# Patient Record
Sex: FEMALE | Race: WHITE | NOT HISPANIC OR LATINO | Employment: UNEMPLOYED | URBAN - METROPOLITAN AREA
[De-identification: names, ages, dates, MRNs, and addresses within clinical notes are randomized per-mention and may not be internally consistent; named-entity substitution may affect disease eponyms.]

---

## 2023-03-30 DIAGNOSIS — I10 BENIGN HYPERTENSION: Primary | ICD-10-CM

## 2023-03-30 PROBLEM — R31.1 BENIGN ESSENTIAL MICROSCOPIC HEMATURIA: Status: ACTIVE | Noted: 2023-03-30

## 2023-03-30 PROBLEM — E66.01 MORBID OBESITY (MULTI): Status: ACTIVE | Noted: 2023-03-30

## 2023-03-30 PROBLEM — D58.2 ELEVATED HEMOGLOBIN (CMS-HCC): Status: ACTIVE | Noted: 2023-03-30

## 2023-03-30 PROBLEM — E83.52 HYPERCALCEMIA: Status: ACTIVE | Noted: 2023-03-30

## 2023-03-30 PROBLEM — R73.9 HYPERGLYCEMIA: Status: ACTIVE | Noted: 2023-03-30

## 2023-03-30 PROBLEM — N12 PYELONEPHRITIS: Status: RESOLVED | Noted: 2023-03-30 | Resolved: 2023-03-30

## 2023-03-30 PROBLEM — R31.9 HEMATURIA: Status: ACTIVE | Noted: 2023-03-30

## 2023-03-30 PROBLEM — B35.4 TINEA CORPORIS: Status: ACTIVE | Noted: 2023-03-30

## 2023-03-30 PROBLEM — D75.1 POLYCYTHEMIA: Status: ACTIVE | Noted: 2023-03-30

## 2023-03-30 PROBLEM — M85.80 OSTEOPENIA: Status: ACTIVE | Noted: 2023-03-30

## 2023-03-30 PROBLEM — M81.0 OSTEOPOROSIS: Status: ACTIVE | Noted: 2023-03-30

## 2023-03-30 RX ORDER — NYSTATIN 100000 [USP'U]/G
POWDER TOPICAL
COMMUNITY
End: 2023-07-26

## 2023-03-30 RX ORDER — ATENOLOL AND CHLORTHALIDONE TABLET 50; 25 MG/1; MG/1
1 TABLET ORAL DAILY
Qty: 90 TABLET | Refills: 0 | Status: SHIPPED | OUTPATIENT
Start: 2023-03-30 | End: 2023-06-30 | Stop reason: SDUPTHER

## 2023-03-30 RX ORDER — CHOLECALCIFEROL (VITAMIN D3) 50 MCG
50 TABLET ORAL DAILY
COMMUNITY

## 2023-03-30 RX ORDER — AMOXICILLIN 500 MG
CAPSULE ORAL
COMMUNITY

## 2023-03-30 RX ORDER — ATENOLOL AND CHLORTHALIDONE TABLET 50; 25 MG/1; MG/1
1 TABLET ORAL DAILY
COMMUNITY
End: 2023-07-26

## 2023-03-30 RX ORDER — PSEUDOEPHEDRINE HCL 30 MG
TABLET ORAL
COMMUNITY

## 2023-03-30 RX ORDER — POTASSIUM CHLORIDE 1500 MG/1
1 TABLET, EXTENDED RELEASE ORAL DAILY
COMMUNITY
Start: 2019-08-16 | End: 2023-07-26 | Stop reason: SDUPTHER

## 2023-03-30 RX ORDER — NYSTATIN AND TRIAMCINOLONE ACETONIDE 100000; 1 [USP'U]/G; MG/G
CREAM TOPICAL
COMMUNITY
Start: 2022-08-19 | End: 2023-07-26

## 2023-06-27 ENCOUNTER — TELEPHONE (OUTPATIENT)
Dept: PRIMARY CARE | Facility: CLINIC | Age: 63
End: 2023-06-27
Payer: COMMERCIAL

## 2023-06-27 DIAGNOSIS — I10 BENIGN HYPERTENSION: ICD-10-CM

## 2023-06-29 NOTE — TELEPHONE ENCOUNTER
Rx Refill Request Telephone Encounter    Name:  Jamar Ibarra  :  [id number]  Medication Name:    Atenolol 50-25mg 1 tab every day - 90 day   Specific Pharmacy location:  [facility]  Date of last appointment:  22  Date of next appointment:  23  Best number to reach patient:  [phone]

## 2023-06-30 RX ORDER — ATENOLOL AND CHLORTHALIDONE TABLET 50; 25 MG/1; MG/1
TABLET ORAL
Qty: 90 TABLET | Refills: 0 | Status: SHIPPED | OUTPATIENT
Start: 2023-06-30 | End: 2023-07-26 | Stop reason: SDUPTHER

## 2023-07-26 ENCOUNTER — OFFICE VISIT (OUTPATIENT)
Dept: PRIMARY CARE | Facility: CLINIC | Age: 63
End: 2023-07-26
Payer: COMMERCIAL

## 2023-07-26 ENCOUNTER — LAB (OUTPATIENT)
Dept: LAB | Facility: LAB | Age: 63
End: 2023-07-26
Payer: COMMERCIAL

## 2023-07-26 VITALS
OXYGEN SATURATION: 94 % | BODY MASS INDEX: 38.4 KG/M2 | DIASTOLIC BLOOD PRESSURE: 63 MMHG | SYSTOLIC BLOOD PRESSURE: 123 MMHG | HEIGHT: 59 IN | WEIGHT: 190.5 LBS | HEART RATE: 53 BPM | TEMPERATURE: 98.1 F

## 2023-07-26 DIAGNOSIS — E66.01 MORBID OBESITY (MULTI): ICD-10-CM

## 2023-07-26 DIAGNOSIS — Z00.00 ROUTINE GENERAL MEDICAL EXAMINATION AT A HEALTH CARE FACILITY: ICD-10-CM

## 2023-07-26 DIAGNOSIS — R73.9 HYPERGLYCEMIA: ICD-10-CM

## 2023-07-26 DIAGNOSIS — I10 BENIGN HYPERTENSION: ICD-10-CM

## 2023-07-26 DIAGNOSIS — Z12.31 VISIT FOR SCREENING MAMMOGRAM: ICD-10-CM

## 2023-07-26 DIAGNOSIS — M85.80 OSTEOPENIA, UNSPECIFIED LOCATION: ICD-10-CM

## 2023-07-26 DIAGNOSIS — Z00.00 ROUTINE GENERAL MEDICAL EXAMINATION AT A HEALTH CARE FACILITY: Primary | ICD-10-CM

## 2023-07-26 DIAGNOSIS — Z78.0 MENOPAUSE: ICD-10-CM

## 2023-07-26 DIAGNOSIS — Z13.820 SCREENING FOR OSTEOPOROSIS: ICD-10-CM

## 2023-07-26 LAB
ALANINE AMINOTRANSFERASE (SGPT) (U/L) IN SER/PLAS: 36 U/L (ref 7–45)
ALBUMIN (G/DL) IN SER/PLAS: 4.2 G/DL (ref 3.4–5)
ALKALINE PHOSPHATASE (U/L) IN SER/PLAS: 43 U/L (ref 33–136)
ANION GAP IN SER/PLAS: 16 MMOL/L (ref 10–20)
ASPARTATE AMINOTRANSFERASE (SGOT) (U/L) IN SER/PLAS: 27 U/L (ref 9–39)
BILIRUBIN TOTAL (MG/DL) IN SER/PLAS: 0.7 MG/DL (ref 0–1.2)
CALCIDIOL (25 OH VITAMIN D3) (NG/ML) IN SER/PLAS: 62 NG/ML
CALCIUM (MG/DL) IN SER/PLAS: 10.4 MG/DL (ref 8.6–10.3)
CARBON DIOXIDE, TOTAL (MMOL/L) IN SER/PLAS: 26 MMOL/L (ref 21–32)
CHLORIDE (MMOL/L) IN SER/PLAS: 100 MMOL/L (ref 98–107)
CHOLESTEROL (MG/DL) IN SER/PLAS: 209 MG/DL (ref 0–199)
CHOLESTEROL IN HDL (MG/DL) IN SER/PLAS: 39.6 MG/DL
CHOLESTEROL/HDL RATIO: 5.3
CREATININE (MG/DL) IN SER/PLAS: 0.75 MG/DL (ref 0.5–1.05)
ERYTHROCYTE DISTRIBUTION WIDTH (RATIO) BY AUTOMATED COUNT: 13.1 % (ref 11.5–14.5)
ERYTHROCYTE MEAN CORPUSCULAR HEMOGLOBIN CONCENTRATION (G/DL) BY AUTOMATED: 34 G/DL (ref 32–36)
ERYTHROCYTE MEAN CORPUSCULAR VOLUME (FL) BY AUTOMATED COUNT: 84 FL (ref 80–100)
ERYTHROCYTES (10*6/UL) IN BLOOD BY AUTOMATED COUNT: 5.77 X10E12/L (ref 4–5.2)
ESTIMATED AVERAGE GLUCOSE FOR HBA1C: 128 MG/DL
GFR FEMALE: 89 ML/MIN/1.73M2
GLUCOSE (MG/DL) IN SER/PLAS: 113 MG/DL (ref 74–99)
HEMATOCRIT (%) IN BLOOD BY AUTOMATED COUNT: 48.6 % (ref 36–46)
HEMOGLOBIN (G/DL) IN BLOOD: 16.5 G/DL (ref 12–16)
HEMOGLOBIN A1C/HEMOGLOBIN TOTAL IN BLOOD: 6.1 %
LDL: 137 MG/DL (ref 0–99)
LEUKOCYTES (10*3/UL) IN BLOOD BY AUTOMATED COUNT: 7.1 X10E9/L (ref 4.4–11.3)
NRBC (PER 100 WBCS) BY AUTOMATED COUNT: 0 /100 WBC (ref 0–0)
PLATELETS (10*3/UL) IN BLOOD AUTOMATED COUNT: 271 X10E9/L (ref 150–450)
POTASSIUM (MMOL/L) IN SER/PLAS: 3.8 MMOL/L (ref 3.5–5.3)
PROTEIN TOTAL: 7 G/DL (ref 6.4–8.2)
SODIUM (MMOL/L) IN SER/PLAS: 138 MMOL/L (ref 136–145)
TRIGLYCERIDE (MG/DL) IN SER/PLAS: 162 MG/DL (ref 0–149)
UREA NITROGEN (MG/DL) IN SER/PLAS: 16 MG/DL (ref 6–23)
VLDL: 32 MG/DL (ref 0–40)

## 2023-07-26 PROCEDURE — 36415 COLL VENOUS BLD VENIPUNCTURE: CPT

## 2023-07-26 PROCEDURE — 80061 LIPID PANEL: CPT

## 2023-07-26 PROCEDURE — 3074F SYST BP LT 130 MM HG: CPT | Performed by: FAMILY MEDICINE

## 2023-07-26 PROCEDURE — 83036 HEMOGLOBIN GLYCOSYLATED A1C: CPT

## 2023-07-26 PROCEDURE — 1036F TOBACCO NON-USER: CPT | Performed by: FAMILY MEDICINE

## 2023-07-26 PROCEDURE — 99396 PREV VISIT EST AGE 40-64: CPT | Performed by: FAMILY MEDICINE

## 2023-07-26 PROCEDURE — 85027 COMPLETE CBC AUTOMATED: CPT

## 2023-07-26 PROCEDURE — 82306 VITAMIN D 25 HYDROXY: CPT

## 2023-07-26 PROCEDURE — 3078F DIAST BP <80 MM HG: CPT | Performed by: FAMILY MEDICINE

## 2023-07-26 PROCEDURE — 80053 COMPREHEN METABOLIC PANEL: CPT

## 2023-07-26 PROCEDURE — 99214 OFFICE O/P EST MOD 30 MIN: CPT | Performed by: FAMILY MEDICINE

## 2023-07-26 RX ORDER — POTASSIUM CHLORIDE 1500 MG/1
20 TABLET, EXTENDED RELEASE ORAL DAILY
Qty: 90 TABLET | Refills: 1 | Status: SHIPPED | OUTPATIENT
Start: 2023-07-26 | End: 2024-01-29

## 2023-07-26 RX ORDER — ATENOLOL AND CHLORTHALIDONE TABLET 50; 25 MG/1; MG/1
1 TABLET ORAL DAILY
Qty: 90 TABLET | Refills: 0 | Status: SHIPPED | OUTPATIENT
Start: 2023-07-26 | End: 2023-07-26 | Stop reason: SDUPTHER

## 2023-07-26 RX ORDER — POTASSIUM CHLORIDE 1500 MG/1
20 TABLET, EXTENDED RELEASE ORAL DAILY
Qty: 90 TABLET | Refills: 0 | Status: SHIPPED | OUTPATIENT
Start: 2023-07-26 | End: 2023-07-26 | Stop reason: SDUPTHER

## 2023-07-26 RX ORDER — ATENOLOL AND CHLORTHALIDONE TABLET 50; 25 MG/1; MG/1
1 TABLET ORAL DAILY
Qty: 90 TABLET | Refills: 1 | Status: SHIPPED | OUTPATIENT
Start: 2023-07-26 | End: 2024-01-29

## 2023-07-26 NOTE — PATIENT INSTRUCTIONS
Get your blood work as ordered.  You should hear from our office with results whether they are normal are not within a few days.  Please call the office if you do not hear from us.     Hypertension  : Patient is taking blood pressure medications as directed.  Blood pressures have been averaging:  Pt denies Chest Pain or Shortness of Breath     Your bone density shows osteopenia which is a thinning of the bones.  I recommend that you take vitamin D 1000 units daily, get an adequate intake of calcium daily.  Postmenopausal women should get 1500 mg of calcium per day.  This is about 4-5 servings per day of calcium rich foods such as dairy products or almond milk.  If you are not getting 1500 mg per day and would recommend adding on a calcium supplement.  Also doing weightbearing exercise is helpful.  We should repeat a DEXA scan 2 years.     You should be getting cardiovascular exercise 3-5 times per week for 30-45 minutes.  This includes exercises such as running, brisk walking, biking or swimming.       It is important to actively try to reduce your weight to become healthier.  There are several things you can do to try and lose weight.  You should be getting 30-45 minutes of cardiovascular exercise 3-5 times per week, activities such as running and jogging treadmill swimming and brisk walking are helpful.  You will also need to watch your portion control, decrease calorie intake overall.  Following a low carbohydrate diet is the most successful way to lose weight and take it off long-term.  In order to achieve weight loss it is important that you track exactly what your calorie intake is during the day.  I usually recommend doing some sort of formal program or Coleen. there are lot of good Apps out there to help you follow your calorie intake such as weight watchers, Noom, Fitbit.  It is recommended that you reduce the intake of sweets, sugar, reduce carbohydrate intake.  Healthy diets with more whole grains,  vegetables, fruits, nuts and seeds with plant oils are healthier diets than animal-based fats.  Reduce your intake of white bread, grains, potatoes, processed foods.     Suggest shingles vaccine

## 2023-07-26 NOTE — PROGRESS NOTES
Subjective   Patient ID: Jamar Ibarra is a 63 y.o. female who presents for Annual Exam. Fasting for labs. No concerns. Requesting a mammogram order.      Hypertension  : Patient is taking blood pressure medications as directed.  Blood pressures have been averaging:  occasionally 123/59, 131/64  Pt denies Chest Pain or Shortness of Breath     Some exercise       Hyperglycemia    Elevated cholesterol     Osteopenia        ROS :  ( No or Yes )  Any eye problems:    N  Frequent nasal congestion or sneezing:  N  Difficulty hearing:  N  Ear problems:   N  Asthma or wheezing:   N  Frequent cough:   N  Shortness of breath:N  Hemoptysis: N  Hx of TB: N  High blood pressure: y  Heart disease: N  Heart murmur:N  Chest pain or pressure with exertion:N  Leg pains with walking up hill: N  Fast heartbeat or palpitations:N  Varicose veins: N  Difficulty swallowing foods or liquids: N  Abdominal pains: N  Frequent indigestion or heartburn: N  Constipation: N  Diarrhea or loose stools: N  Weight changes recently: N  Change in bowel movements: N  An ulcer: N  Black stools: N  Jaundice, hepatitis or liver problems: N  Gallstones or gallbladder problems: N  Stomach or intestinal problems: N  Vomited blood : N  Blood in bowel movements: N  Sickle cell trait  or Anemia: N  Been refused as a blood donor: N  Problems with her kidney, bladder, or prostate: N  Loss of control of your urine: N  Pain or burning with urination: N  Blood in her urine: N  Trouble starting flow of urine: N  Frequent urination at night: N  History of venereal disease: N  Any skin problems: N  Diabetes: N  Thyroid disease: N  Frequent back pain: N  Pain or swelling around joints: N  Broken any bones: N  Frequent headaches: N  Dizziness: N  Have you ever had Seizures or convulsions: N  Have you ever temporarily lost control of your hand or foot : N   Had a stroke or been paralyzed : N  Temporarily lost your ability to speak: N  Fainted or lost consciousness:  N  Hallucinations: N  Nervousness: N  Do you take medications for your nerves: N  Trouble falling asleep or staying asleep: N  Do you feel tired even after a good night sleep: N  Do you feel down in the dumps or depressed: N  Frequent crying: N  Using alcohol excessively: N  Any street drug use : N  Do have any other medical problems that are concerns :     Review of Systems    Objective   There were no vitals taken for this visit.    Physical Exam  Constitutional:       General: She is not in acute distress.     Appearance: Normal appearance.   HENT:      Head: Normocephalic and atraumatic.      Right Ear: Tympanic membrane and ear canal normal.      Left Ear: Tympanic membrane and ear canal normal.      Mouth/Throat:      Mouth: Mucous membranes are moist.   Eyes:      Conjunctiva/sclera: Conjunctivae normal.      Pupils: Pupils are equal, round, and reactive to light.   Neck:      Vascular: No carotid bruit.   Cardiovascular:      Rate and Rhythm: Normal rate and regular rhythm.      Heart sounds: No murmur heard.  Pulmonary:      Effort: Pulmonary effort is normal.      Breath sounds: Normal breath sounds. No wheezing or rhonchi.   Abdominal:      General: Bowel sounds are normal.      Palpations: Abdomen is soft.   Musculoskeletal:         General: No swelling.      Cervical back: No rigidity.   Lymphadenopathy:      Cervical: No cervical adenopathy.   Skin:     General: Skin is warm and dry.      Findings: No rash.   Neurological:      General: No focal deficit present.      Mental Status: She is alert.   Psychiatric:         Mood and Affect: Mood normal.         Assessment/Plan   Problem List Items Addressed This Visit       Benign hypertension    Hyperglycemia    Morbid obesity (CMS/HCC)    Osteopenia     Other Visit Diagnoses       Routine general medical examination at a health care facility    -  Primary

## 2023-07-27 ENCOUNTER — TELEPHONE (OUTPATIENT)
Dept: PRIMARY CARE | Facility: CLINIC | Age: 63
End: 2023-07-27
Payer: COMMERCIAL

## 2023-07-27 NOTE — TELEPHONE ENCOUNTER
----- Message from Socorro Perdomo MD sent at 7/27/2023 11:45 AM EDT -----  Sugar is elevated in a prediabetic range and cholesterol is elevated, recommend low-carb low sugar diet, weight loss, increase exercise no anemia, kidney and liver function normal, vitamin D is okay  Follow-up 6 months for recheck

## 2023-07-27 NOTE — RESULT ENCOUNTER NOTE
Sugar is elevated in a prediabetic range and cholesterol is elevated, recommend low-carb low sugar diet, weight loss, increase exercise no anemia, kidney and liver function normal, vitamin D is okay  Follow-up 6 months for recheck

## 2023-10-16 ENCOUNTER — HOSPITAL ENCOUNTER (OUTPATIENT)
Dept: RADIOLOGY | Facility: HOSPITAL | Age: 63
Discharge: HOME | End: 2023-10-16
Payer: COMMERCIAL

## 2023-10-16 DIAGNOSIS — Z12.31 ENCOUNTER FOR SCREENING MAMMOGRAM FOR MALIGNANT NEOPLASM OF BREAST: ICD-10-CM

## 2023-10-16 DIAGNOSIS — Z13.820 ENCOUNTER FOR SCREENING FOR OSTEOPOROSIS: ICD-10-CM

## 2023-10-16 DIAGNOSIS — Z78.0 ASYMPTOMATIC MENOPAUSAL STATE: ICD-10-CM

## 2023-10-16 PROCEDURE — 77067 SCR MAMMO BI INCL CAD: CPT

## 2023-10-16 PROCEDURE — 77067 SCR MAMMO BI INCL CAD: CPT | Mod: BILATERAL PROCEDURE | Performed by: RADIOLOGY

## 2023-10-16 PROCEDURE — 77080 DXA BONE DENSITY AXIAL: CPT | Performed by: RADIOLOGY

## 2023-10-16 PROCEDURE — 77063 BREAST TOMOSYNTHESIS BI: CPT | Mod: BILATERAL PROCEDURE | Performed by: RADIOLOGY

## 2023-10-16 PROCEDURE — 77080 DXA BONE DENSITY AXIAL: CPT

## 2023-10-17 ENCOUNTER — TELEPHONE (OUTPATIENT)
Dept: PRIMARY CARE | Facility: CLINIC | Age: 63
End: 2023-10-17
Payer: COMMERCIAL

## 2023-10-17 DIAGNOSIS — R92.8 ABNORMAL MAMMOGRAM: Primary | ICD-10-CM

## 2023-10-17 NOTE — TELEPHONE ENCOUNTER
Result Communication    Resulted Orders   XR DEXA bone density    Narrative    Interpreted By:  Ramone Cantu,   STUDY:  DEXA BONE [id number]/16/2023 9:16 am      INDICATION:  Signs/Symptoms: screen.  The patient is a 64 y/o  year old F.      COMPARISON:  10/12/2021      ACCESSION NUMBER(S):  [id number]      ORDERING CLINICIAN:  YESENIA HOOVER      TECHNIQUE:  DEXA BONE DENSITY      FINDINGS:  SPINE L1-L4  Bone Mineral Density: 0.836  T-Score -1.9  Z-Score -0.2  Bone Mineral Density change vs baseline:  10.8  Bone Mineral Density change vs previous: 2.5      LEFT FEMUR -TOTAL  Bone Mineral Density: 0.948  T-Score 0.0   Z-Score  1.3  Bone Mineral Density change vs baseline: 24.1  Bone Mineral Density change vs previous: 25.4      LEFT FEMUR -NECK  Bone Mineral Density: 0.639  T-Score -1.9  Z-Score -0.4      LEFT FOREARM  Total  Bone Mineral Density: Not reported  T-Score Not reported  Z-Score Not reported  UD  Bone Mineral Density: Not reported  T-Score Not reported Z-Score Not reported  MID  Bone Mineral Density: Not reported  T-Score Not reported Z-Score Not reported  1/3  Bone Mineral Density: Not reported  T-Score Not reported   Z-Score Not reported  Bone Mineral Density change vs baseline:  Not reported  Bone Mineral Density change vs previous:  Not reported      RIGHT FEMUR -TOTAL  Bone Mineral Density: Not reported  T-Score Not reported   Z-Score Not reported  Bone Mineral Density change vs baseline:  Not reported  Bone Mineral Density change vs previous: Not reported      RIGHT FEMUR -NECK  Bone Mineral Density: Not reported  T-Score Not reported   Z-Score  Not reported      RIGHT FOREARM  Bone Mineral Density: Not reported  T-Score Not reported   Z-Score Not reported  UD  Bone Mineral Density: Not reported  T-Score Not reported  Z-Score Not reported  MID  Bone Mineral Density: Not reported  T-Score Not reported  Z-Score  Not reported  1/3  Bone Mineral Density: Not reported  T-Score Not reported  Z-Score  Not reported  Bone Mineral Density change vs baseline:  Not reported  Bone Mineral Density change vs previous:  Not reported          World Health Organization (WHO) criteria for post-menopausal,   Women:  Normal:         T-score at or above -1 SD  Osteopenia:   T-score between -1 and -2.5 SD  Osteoporosis: T-score at or below -2.5 SD          10-year Fracture Risk:  Major Osteoporotic Fracture  8.9  Hip Fracture                        1.1      Note:  If no FRAX score is reported, it is because:  Some T-score for Spine Total or Hip Total or Femoral Neck at or below  -2.5      This exam was performed at [facility] on a [employer] Dexa Unit.        Impression    DEXA:  According to World Health Organization criteria,  classification is low bone mass (osteopenia)      Followup recommended in two years or sooner as clinically warranted.      All images and detailed analysis are available on the  Radiology  PACS.      MACRO:  None      Signed by: Ramone Cantu 10/16/2023 4:47 PM  Dictation workstation:   [id number]       4:00 PM  Socorro Perdomo MD  [address] [place] [address] Clinical Support Staff  Bone density does show some osteopenia but it is improved from previous continue calcium, vitamin D, regular weightbearing exercise    Results were successfully communicated with the patient and they acknowledged their understanding.

## 2023-10-17 NOTE — TELEPHONE ENCOUNTER
----- Message from Socorro Perdomo MD sent at 10/17/2023 11:09 AM EDT -----  Bone density does show some osteopenia but it is improved from previous continue calcium, vitamin D, regular weightbearing exercise

## 2023-10-18 ENCOUNTER — TELEPHONE (OUTPATIENT)
Dept: PRIMARY CARE | Facility: CLINIC | Age: 63
End: 2023-10-18
Payer: COMMERCIAL

## 2023-10-18 NOTE — TELEPHONE ENCOUNTER
Result Communication    Resulted Orders   BI mammo bilateral screening tomosynthesis    Narrative    Interpreted By:  Ramone Cantu,   STUDY:  BI MAMMO BILATERAL SCREENING TOMOSYNTHESIS;  10/16/2023 9:19 am      ACCESSION NUMBER(S):  [id number]      ORDERING CLINICIAN:  YESENIA HOOVER      INDICATION:  Screening.      COMPARISON:  10/14/2022      FINDINGS:  2D and tomosynthesis images were reviewed at 1 mm slice thickness.      Density:  There are areas of scattered fibroglandular tissue.      Slight interval increase in size of sharply marginated mass within  the upper-outer quadrant of the left breast, axillary tail when  compared to previous exam. This is located at 2 o'clock position  approximately 16.6 cm from the nipple. No additional suspicious  masses or microcalcifications bilaterally.        Impression    1. No mammographic evidence for malignancy in the right breast  2. Slight interval increase in size of left breast mass as above  which needs further evaluation with targeted ultrasound.          BI-RADS CATEGORY:      BI-RADS Category:  0 Incomplete; Need Additional Imaging Evaluation  and/or Prior Mammograms for Comparison. Recommendation:  Additional  Imaging Diagnostic Mammogram. Recommended Date:  Immediate.  Laterality:  Left.                              For any future breast imaging appointments, please call [phone] ([phone]).          MACRO:  None          Signed by: Ramone Cantu 10/17/2023 3:49 PM  Dictation workstation:   [id number]       12:12 PM  MD AKIRA [facility]irre [facility] [place] [address] Clinical Support Staff  The left breast requires further evaluation with an ultrasound    LVM. CA

## 2023-10-18 NOTE — TELEPHONE ENCOUNTER
----- Message from Socorro Perdomo MD sent at 10/17/2023  4:22 PM EDT -----  The left breast requires further evaluation with an ultrasound

## 2023-10-26 ENCOUNTER — ANCILLARY PROCEDURE (OUTPATIENT)
Dept: RADIOLOGY | Facility: HOSPITAL | Age: 63
End: 2023-10-26
Payer: COMMERCIAL

## 2023-10-26 DIAGNOSIS — R92.8 ABNORMAL MAMMOGRAM: ICD-10-CM

## 2023-10-26 PROCEDURE — 76642 ULTRASOUND BREAST LIMITED: CPT | Mod: LEFT SIDE | Performed by: RADIOLOGY

## 2023-10-26 PROCEDURE — 76982 USE 1ST TARGET LESION: CPT

## 2023-10-26 PROCEDURE — 76642 ULTRASOUND BREAST LIMITED: CPT | Mod: LT

## 2023-10-27 ENCOUNTER — TELEPHONE (OUTPATIENT)
Dept: PRIMARY CARE | Facility: CLINIC | Age: 63
End: 2023-10-27
Payer: COMMERCIAL

## 2023-10-27 NOTE — TELEPHONE ENCOUNTER
----- Message from Socorro Perdomo MD sent at 10/26/2023  5:17 PM EDT -----  The breast looks benign, area of concern is consistent with a benign lymph node, repeat mammogram 1 year

## 2024-01-29 DIAGNOSIS — R73.9 HYPERGLYCEMIA: ICD-10-CM

## 2024-01-29 DIAGNOSIS — E66.01 MORBID OBESITY (MULTI): ICD-10-CM

## 2024-01-29 DIAGNOSIS — M85.80 OSTEOPENIA, UNSPECIFIED LOCATION: ICD-10-CM

## 2024-01-29 DIAGNOSIS — Z00.00 ROUTINE GENERAL MEDICAL EXAMINATION AT A HEALTH CARE FACILITY: ICD-10-CM

## 2024-01-29 DIAGNOSIS — I10 BENIGN HYPERTENSION: ICD-10-CM

## 2024-01-29 RX ORDER — POTASSIUM CHLORIDE 1500 MG/1
20 TABLET, EXTENDED RELEASE ORAL DAILY
Qty: 90 TABLET | Refills: 0 | Status: SHIPPED | OUTPATIENT
Start: 2024-01-29 | End: 2024-04-26

## 2024-01-29 RX ORDER — ASPIRIN 81 MG/1
TABLET ORAL
COMMUNITY
End: 2024-05-20

## 2024-01-29 RX ORDER — ATENOLOL AND CHLORTHALIDONE TABLET 50; 25 MG/1; MG/1
1 TABLET ORAL DAILY
Qty: 90 TABLET | Refills: 0 | Status: SHIPPED | OUTPATIENT
Start: 2024-01-29 | End: 2024-04-26

## 2024-04-25 DIAGNOSIS — E66.01 MORBID OBESITY (MULTI): ICD-10-CM

## 2024-04-25 DIAGNOSIS — I10 BENIGN HYPERTENSION: ICD-10-CM

## 2024-04-25 DIAGNOSIS — Z00.00 ROUTINE GENERAL MEDICAL EXAMINATION AT A HEALTH CARE FACILITY: ICD-10-CM

## 2024-04-25 DIAGNOSIS — M85.80 OSTEOPENIA, UNSPECIFIED LOCATION: ICD-10-CM

## 2024-04-25 DIAGNOSIS — R73.9 HYPERGLYCEMIA: ICD-10-CM

## 2024-04-26 RX ORDER — POTASSIUM CHLORIDE 1500 MG/1
20 TABLET, EXTENDED RELEASE ORAL DAILY
Qty: 90 TABLET | Refills: 0 | Status: SHIPPED | OUTPATIENT
Start: 2024-04-26 | End: 2024-05-20 | Stop reason: SDUPTHER

## 2024-04-26 RX ORDER — ATENOLOL AND CHLORTHALIDONE TABLET 50; 25 MG/1; MG/1
1 TABLET ORAL DAILY
Qty: 90 TABLET | Refills: 0 | Status: SHIPPED | OUTPATIENT
Start: 2024-04-26 | End: 2024-05-20 | Stop reason: SDUPTHER

## 2024-05-17 PROBLEM — N60.19 FIBROCYSTIC BREAST CHANGES: Status: ACTIVE | Noted: 2024-05-17

## 2024-05-17 PROBLEM — Z90.710 H/O HYSTERECTOMY FOR BENIGN DISEASE: Status: ACTIVE | Noted: 2024-05-17

## 2024-05-17 PROBLEM — E78.2 MIXED HYPERLIPIDEMIA: Status: ACTIVE | Noted: 2024-05-17

## 2024-05-17 PROBLEM — E89.40 SURGICAL MENOPAUSE: Status: ACTIVE | Noted: 2024-05-17

## 2024-05-17 PROBLEM — R74.02 ELEVATION OF LEVEL OF TRANSAMINASE AND LACTIC ACID DEHYDROGENASE (LDH): Status: ACTIVE | Noted: 2024-05-17

## 2024-05-17 PROBLEM — E55.9 VITAMIN D DEFICIENCY: Status: ACTIVE | Noted: 2024-05-17

## 2024-05-17 PROBLEM — N81.11 CYSTOCELE, MIDLINE: Status: ACTIVE | Noted: 2024-05-17

## 2024-05-17 PROBLEM — R74.01 ELEVATION OF LEVEL OF TRANSAMINASE AND LACTIC ACID DEHYDROGENASE (LDH): Status: ACTIVE | Noted: 2024-05-17

## 2024-05-20 ENCOUNTER — LAB (OUTPATIENT)
Dept: LAB | Facility: LAB | Age: 64
End: 2024-05-20
Payer: MEDICARE

## 2024-05-20 ENCOUNTER — OFFICE VISIT (OUTPATIENT)
Dept: PRIMARY CARE | Facility: CLINIC | Age: 64
End: 2024-05-20
Payer: MEDICARE

## 2024-05-20 VITALS
HEART RATE: 55 BPM | OXYGEN SATURATION: 92 % | SYSTOLIC BLOOD PRESSURE: 134 MMHG | WEIGHT: 186 LBS | HEIGHT: 59 IN | DIASTOLIC BLOOD PRESSURE: 84 MMHG | BODY MASS INDEX: 37.5 KG/M2

## 2024-05-20 DIAGNOSIS — E55.9 VITAMIN D DEFICIENCY: ICD-10-CM

## 2024-05-20 DIAGNOSIS — I10 BENIGN HYPERTENSION: Primary | ICD-10-CM

## 2024-05-20 DIAGNOSIS — Z00.00 ROUTINE GENERAL MEDICAL EXAMINATION AT A HEALTH CARE FACILITY: ICD-10-CM

## 2024-05-20 DIAGNOSIS — D58.2 ELEVATED HEMOGLOBIN (CMS-HCC): ICD-10-CM

## 2024-05-20 DIAGNOSIS — Z23 NEED FOR PNEUMOCOCCAL 20-VALENT CONJUGATE VACCINATION: ICD-10-CM

## 2024-05-20 DIAGNOSIS — E66.01 MORBID OBESITY (MULTI): ICD-10-CM

## 2024-05-20 DIAGNOSIS — R73.9 HYPERGLYCEMIA: ICD-10-CM

## 2024-05-20 DIAGNOSIS — Z13.6 SCREENING FOR CARDIOVASCULAR CONDITION: ICD-10-CM

## 2024-05-20 DIAGNOSIS — E78.2 MIXED HYPERLIPIDEMIA: ICD-10-CM

## 2024-05-20 DIAGNOSIS — M85.80 OSTEOPENIA, UNSPECIFIED LOCATION: ICD-10-CM

## 2024-05-20 DIAGNOSIS — E83.52 HYPERCALCEMIA: ICD-10-CM

## 2024-05-20 DIAGNOSIS — R94.31 ABNORMAL EKG: ICD-10-CM

## 2024-05-20 DIAGNOSIS — Z12.31 ENCOUNTER FOR SCREENING MAMMOGRAM FOR MALIGNANT NEOPLASM OF BREAST: ICD-10-CM

## 2024-05-20 DIAGNOSIS — I10 BENIGN HYPERTENSION: ICD-10-CM

## 2024-05-20 PROBLEM — B35.4 TINEA CORPORIS: Status: RESOLVED | Noted: 2023-03-30 | Resolved: 2024-05-20

## 2024-05-20 PROBLEM — R74.02 ELEVATION OF LEVEL OF TRANSAMINASE AND LACTIC ACID DEHYDROGENASE (LDH): Status: RESOLVED | Noted: 2024-05-17 | Resolved: 2024-05-20

## 2024-05-20 PROBLEM — R74.01 ELEVATION OF LEVEL OF TRANSAMINASE AND LACTIC ACID DEHYDROGENASE (LDH): Status: RESOLVED | Noted: 2024-05-17 | Resolved: 2024-05-20

## 2024-05-20 LAB
25(OH)D3 SERPL-MCNC: 58 NG/ML (ref 30–100)
ALBUMIN SERPL BCP-MCNC: 4.1 G/DL (ref 3.4–5)
ALP SERPL-CCNC: 46 U/L (ref 33–136)
ALT SERPL W P-5'-P-CCNC: 24 U/L (ref 7–45)
ANION GAP SERPL CALC-SCNC: 14 MMOL/L (ref 10–20)
AST SERPL W P-5'-P-CCNC: 20 U/L (ref 9–39)
BILIRUB SERPL-MCNC: 0.5 MG/DL (ref 0–1.2)
BUN SERPL-MCNC: 15 MG/DL (ref 6–23)
CALCIUM SERPL-MCNC: 9.7 MG/DL (ref 8.6–10.3)
CHLORIDE SERPL-SCNC: 100 MMOL/L (ref 98–107)
CHOLEST SERPL-MCNC: 201 MG/DL (ref 0–199)
CHOLESTEROL/HDL RATIO: 5.7
CO2 SERPL-SCNC: 31 MMOL/L (ref 21–32)
CREAT SERPL-MCNC: 0.74 MG/DL (ref 0.5–1.05)
EGFRCR SERPLBLD CKD-EPI 2021: 90 ML/MIN/1.73M*2
ERYTHROCYTE [DISTWIDTH] IN BLOOD BY AUTOMATED COUNT: 12.5 % (ref 11.5–14.5)
EST. AVERAGE GLUCOSE BLD GHB EST-MCNC: 120 MG/DL
GLUCOSE SERPL-MCNC: 122 MG/DL (ref 74–99)
HBA1C MFR BLD: 5.8 %
HCT VFR BLD AUTO: 47.4 % (ref 36–46)
HDLC SERPL-MCNC: 35.1 MG/DL
HGB BLD-MCNC: 15.8 G/DL (ref 12–16)
LDLC SERPL CALC-MCNC: 129 MG/DL
MCH RBC QN AUTO: 28.7 PG (ref 26–34)
MCHC RBC AUTO-ENTMCNC: 33.3 G/DL (ref 32–36)
MCV RBC AUTO: 86 FL (ref 80–100)
NON HDL CHOLESTEROL: 166 MG/DL (ref 0–149)
NRBC BLD-RTO: 0 /100 WBCS (ref 0–0)
PLATELET # BLD AUTO: 296 X10*3/UL (ref 150–450)
POTASSIUM SERPL-SCNC: 3.6 MMOL/L (ref 3.5–5.3)
PROT SERPL-MCNC: 7.3 G/DL (ref 6.4–8.2)
RBC # BLD AUTO: 5.51 X10*6/UL (ref 4–5.2)
SODIUM SERPL-SCNC: 141 MMOL/L (ref 136–145)
TRIGL SERPL-MCNC: 186 MG/DL (ref 0–149)
TSH SERPL-ACNC: 3.32 MIU/L (ref 0.44–3.98)
VLDL: 37 MG/DL (ref 0–40)
WBC # BLD AUTO: 9.9 X10*3/UL (ref 4.4–11.3)

## 2024-05-20 PROCEDURE — 1160F RVW MEDS BY RX/DR IN RCRD: CPT | Performed by: FAMILY MEDICINE

## 2024-05-20 PROCEDURE — 1124F ACP DISCUSS-NO DSCNMKR DOCD: CPT | Performed by: FAMILY MEDICINE

## 2024-05-20 PROCEDURE — 80053 COMPREHEN METABOLIC PANEL: CPT

## 2024-05-20 PROCEDURE — 1159F MED LIST DOCD IN RCRD: CPT | Performed by: FAMILY MEDICINE

## 2024-05-20 PROCEDURE — 90677 PCV20 VACCINE IM: CPT | Performed by: FAMILY MEDICINE

## 2024-05-20 PROCEDURE — 3079F DIAST BP 80-89 MM HG: CPT | Performed by: FAMILY MEDICINE

## 2024-05-20 PROCEDURE — 83036 HEMOGLOBIN GLYCOSYLATED A1C: CPT

## 2024-05-20 PROCEDURE — 85027 COMPLETE CBC AUTOMATED: CPT

## 2024-05-20 PROCEDURE — 84443 ASSAY THYROID STIM HORMONE: CPT

## 2024-05-20 PROCEDURE — G0402 INITIAL PREVENTIVE EXAM: HCPCS | Performed by: FAMILY MEDICINE

## 2024-05-20 PROCEDURE — 3075F SYST BP GE 130 - 139MM HG: CPT | Performed by: FAMILY MEDICINE

## 2024-05-20 PROCEDURE — 1170F FXNL STATUS ASSESSED: CPT | Performed by: FAMILY MEDICINE

## 2024-05-20 PROCEDURE — 99213 OFFICE O/P EST LOW 20 MIN: CPT | Performed by: FAMILY MEDICINE

## 2024-05-20 PROCEDURE — 82306 VITAMIN D 25 HYDROXY: CPT

## 2024-05-20 PROCEDURE — 80061 LIPID PANEL: CPT

## 2024-05-20 PROCEDURE — 36415 COLL VENOUS BLD VENIPUNCTURE: CPT

## 2024-05-20 PROCEDURE — G0403 EKG FOR INITIAL PREVENT EXAM: HCPCS | Performed by: FAMILY MEDICINE

## 2024-05-20 PROCEDURE — 99397 PER PM REEVAL EST PAT 65+ YR: CPT | Performed by: FAMILY MEDICINE

## 2024-05-20 PROCEDURE — 1036F TOBACCO NON-USER: CPT | Performed by: FAMILY MEDICINE

## 2024-05-20 PROCEDURE — G0009 ADMIN PNEUMOCOCCAL VACCINE: HCPCS | Performed by: FAMILY MEDICINE

## 2024-05-20 RX ORDER — ATENOLOL AND CHLORTHALIDONE TABLET 50; 25 MG/1; MG/1
1 TABLET ORAL DAILY
Qty: 90 TABLET | Refills: 1 | Status: SHIPPED | OUTPATIENT
Start: 2024-05-20

## 2024-05-20 RX ORDER — POTASSIUM CHLORIDE 20 MEQ/1
20 TABLET, EXTENDED RELEASE ORAL DAILY
Qty: 90 TABLET | Refills: 1 | Status: SHIPPED | OUTPATIENT
Start: 2024-05-20

## 2024-05-20 ASSESSMENT — ACTIVITIES OF DAILY LIVING (ADL)
DOING_HOUSEWORK: INDEPENDENT
GROCERY_SHOPPING: INDEPENDENT
BATHING: INDEPENDENT
TAKING_MEDICATION: INDEPENDENT
MANAGING_FINANCES: INDEPENDENT
DRESSING: INDEPENDENT

## 2024-05-20 ASSESSMENT — ENCOUNTER SYMPTOMS
DEPRESSION: 0
LOSS OF SENSATION IN FEET: 0
COUGH: 1
FEVER: 0
SHORTNESS OF BREATH: 0
OCCASIONAL FEELINGS OF UNSTEADINESS: 0

## 2024-05-20 ASSESSMENT — PATIENT HEALTH QUESTIONNAIRE - PHQ9
1. LITTLE INTEREST OR PLEASURE IN DOING THINGS: NOT AT ALL
SUM OF ALL RESPONSES TO PHQ9 QUESTIONS 1 AND 2: 0
2. FEELING DOWN, DEPRESSED OR HOPELESS: NOT AT ALL

## 2024-05-20 NOTE — PATIENT INSTRUCTIONS
Get your blood work as ordered.  You should hear from our office with results whether they are normal are not within a few days.  Please call the office if you do not hear from us.     After you get testing done you will get notified from our office with regards to your results whether they are normal or not.  If you are registered in the electronic health record we will send you information in that system.  If you have any questions or need clarification please feel free to call the office. Hypertension Plan:  You should check your blood pressures 2-3 times per month.  Your goal should be systolic (upper number ) < 140, and diastolic (bottom number) < 90.  Please periodically inform office of your BP numbers.  You should follow a low salt diet and exercise routinely.  It is important that you keep your weight under control.  With hypertension you should be seen in the office at least twice per year.     You should be getting cardiovascular exercise 3-5 times per week for 30-45 minutes.  This includes exercises such as running, brisk walking, biking or swimming.      Your bone density shows osteopenia which is a thinning of the bones.  I recommend that you take vitamin D 1000 units daily, get an adequate intake of calcium daily.  Postmenopausal women should get 1500 mg of calcium per day.  This is about 4-5 servings per day of calcium rich foods such as dairy products or almond milk.  If you are not getting 1500 mg per day and would recommend adding on a calcium supplement.  Also doing weightbearing exercise is helpful.  We should repeat a DEXA scan 2 years.       Advanced directives: I discussed with the patient  advanced care planning including explanation of discussions on advance directives for >15 min.  If patient does not have current up-to-date documents, examples and information provided on living will and power of .  Patient was encouraged to work on getting these documents completed.  [facility] has  simple advance directives and living will forms that can be used.  Also, you can get more involved forms done through a  if you desire more detail on your living will plans or more involved plans for power of .      You may take over-the-counter medications as needed for symptom relief.  Call the office if your symptoms worsen such as high fever and worsening cough or increase in symptoms.    Follow up if symptoms worsen or persist.    Recommend Prevnar and shingles vaccines

## 2024-05-20 NOTE — PROGRESS NOTES
"Subjective   Reason for Visit: Jamar Ibarra is an 64 y.o. female here for a Medicare Wellness visit.     Past Medical, Surgical, and Family History reviewed and updated in chart.    Reviewed all medications by prescribing practitioner or clinical pharmacist (such as prescriptions, OTCs, herbal therapies and supplements) and documented in the medical record.    Nasal congestion   Fatigue over last 6 days     Hypertension  : Patient is taking blood pressure medications as directed.  Blood pressures have been averaging:  < 130/  80s     Pt denies Chest Pain or Shortness of Breath    Some exercise       Coronary calcium score a few years ago looked okay  Hyperglycemia     Elevated cholesterol      Osteopenia     Colonoscopy in 8/19  :  age 59           Patient Care Team:  Socorro Perdomo MD as PCP - General (Family Medicine)  Socorro Perdomo MD     Review of Systems   Constitutional:  Negative for fever.   HENT:  Positive for congestion.    Respiratory:  Positive for cough. Negative for shortness of breath.    Cardiovascular:  Negative for chest pain.       Objective   Vitals:  /84   Pulse 55   Ht 1.499 m (4' 11\")   Wt 84.4 kg (186 lb)   SpO2 92%   BMI 37.57 kg/m²       Physical Exam  Constitutional:       General: She is not in acute distress.     Appearance: Normal appearance.   HENT:      Head: Normocephalic and atraumatic.      Right Ear: Tympanic membrane, ear canal and external ear normal.      Left Ear: Tympanic membrane, ear canal and external ear normal.      Nose: Nose normal.      Mouth/Throat:      Mouth: Mucous membranes are moist.      Pharynx: No oropharyngeal exudate or posterior oropharyngeal erythema.   Eyes:      Extraocular Movements: Extraocular movements intact.      Conjunctiva/sclera: Conjunctivae normal.      Pupils: Pupils are equal, round, and reactive to light.   Cardiovascular:      Rate and Rhythm: Normal rate and regular rhythm.      Heart sounds: No murmur " heard.  Pulmonary:      Effort: Pulmonary effort is normal.      Breath sounds: Normal breath sounds.   Chest:   Breasts:     Right: Normal.      Left: Normal.   Abdominal:      General: Bowel sounds are normal.      Palpations: Abdomen is soft.   Musculoskeletal:         General: Normal range of motion.      Cervical back: No rigidity.   Lymphadenopathy:      Cervical: No cervical adenopathy.      Upper Body:      Right upper body: No axillary adenopathy.      Left upper body: No axillary adenopathy.   Skin:     General: Skin is warm and dry.      Findings: No rash.   Neurological:      General: No focal deficit present.      Mental Status: She is alert and oriented to person, place, and time.      Cranial Nerves: No cranial nerve deficit.      Gait: Gait normal.   Psychiatric:         Mood and Affect: Mood normal.         Behavior: Behavior normal.         Assessment/Plan   Problem List Items Addressed This Visit       Benign hypertension - Primary    Relevant Medications    potassium chloride CR 20 mEq ER tablet    atenoloL-chlorthalidone (Tenoretic) 50-25 mg tablet    Other Relevant Orders    Thyroid Stimulating Hormone    Comprehensive Metabolic Panel    CBC    Lipid Panel    Hemoglobin A1C    Vitamin D 25-Hydroxy,Total (for eval of Vitamin D levels)    Elevated hemoglobin (CMS-HCC)    Hypercalcemia    Relevant Orders    Thyroid Stimulating Hormone    Comprehensive Metabolic Panel    CBC    Lipid Panel    Hemoglobin A1C    Vitamin D 25-Hydroxy,Total (for eval of Vitamin D levels)    Hyperglycemia    Relevant Orders    Thyroid Stimulating Hormone    Comprehensive Metabolic Panel    CBC    Lipid Panel    Hemoglobin A1C    Vitamin D 25-Hydroxy,Total (for eval of Vitamin D levels)    Morbid obesity (Multi)    Relevant Orders    Thyroid Stimulating Hormone    Comprehensive Metabolic Panel    CBC    Lipid Panel    Hemoglobin A1C    Vitamin D 25-Hydroxy,Total (for eval of Vitamin D levels)    Osteopenia    Relevant  Orders    Thyroid Stimulating Hormone    Comprehensive Metabolic Panel    CBC    Lipid Panel    Hemoglobin A1C    Vitamin D 25-Hydroxy,Total (for eval of Vitamin D levels)    Vitamin D deficiency    Relevant Orders    Thyroid Stimulating Hormone    Comprehensive Metabolic Panel    CBC    Lipid Panel    Hemoglobin A1C    Vitamin D 25-Hydroxy,Total (for eval of Vitamin D levels)    Mixed hyperlipidemia    Relevant Orders    Thyroid Stimulating Hormone    Comprehensive Metabolic Panel    CBC    Lipid Panel    Hemoglobin A1C    Vitamin D 25-Hydroxy,Total (for eval of Vitamin D levels)     Other Visit Diagnoses       Routine general medical examination at a health care facility        Relevant Orders    Thyroid Stimulating Hormone    Comprehensive Metabolic Panel    CBC    Lipid Panel    Hemoglobin A1C    Vitamin D 25-Hydroxy,Total (for eval of Vitamin D levels)    Encounter for screening mammogram for malignant neoplasm of breast        Relevant Orders    BI mammo bilateral screening tomosynthesis    Screening for cardiovascular condition        Relevant Orders    ECG 12 lead (Completed)    Abnormal EKG        Relevant Orders    Transthoracic Echo Complete    Need for pneumococcal 20-valent conjugate vaccination        Relevant Orders    Pneumococcal conjugate vaccine, 20-valent (PREVNAR 20) (Completed)

## 2024-05-21 ENCOUNTER — TELEPHONE (OUTPATIENT)
Dept: PRIMARY CARE | Facility: CLINIC | Age: 64
End: 2024-05-21
Payer: MEDICARE

## 2024-05-21 NOTE — TELEPHONE ENCOUNTER
----- Message from Socorro Perdomo MD sent at 5/21/2024 11:46 AM EDT -----  Sugar and cholesterol mildly elevated recommend weight loss, regular exercise, rest of labs normal

## 2024-05-22 ENCOUNTER — TELEPHONE (OUTPATIENT)
Dept: PRIMARY CARE | Facility: CLINIC | Age: 64
End: 2024-05-22
Payer: MEDICARE

## 2024-05-22 NOTE — TELEPHONE ENCOUNTER
Pt calling the injection site of her pneumo vaccine is still a large lump red and hot. She was wondering if that was normal?

## 2024-05-22 NOTE — TELEPHONE ENCOUNTER
Pt informed pneumo shot does/can cause red lump.  Can last 3-4 days.  Pt to call back if does not subside or if the redness spreads.  Redness is just around injection site at this time. No sob, no other symptoms.  Pt to use cold compresses and use otc analgesics.

## 2024-06-17 ENCOUNTER — TELEPHONE (OUTPATIENT)
Dept: PRIMARY CARE | Facility: CLINIC | Age: 64
End: 2024-06-17
Payer: MEDICARE

## 2024-06-17 NOTE — TELEPHONE ENCOUNTER
Pt LVMM in regards to having questions about the abnormal EKG.      Discussed with pt  echo scheduling ?s

## 2024-07-11 ENCOUNTER — HOSPITAL ENCOUNTER (OUTPATIENT)
Dept: CARDIOLOGY | Facility: HOSPITAL | Age: 64
Discharge: HOME | End: 2024-07-11
Payer: MEDICARE

## 2024-07-11 ENCOUNTER — TELEPHONE (OUTPATIENT)
Dept: PRIMARY CARE | Facility: CLINIC | Age: 64
End: 2024-07-11

## 2024-07-11 DIAGNOSIS — R94.31 ABNORMAL EKG: ICD-10-CM

## 2024-07-11 LAB
AORTIC VALVE MEAN GRADIENT: 3 MMHG
AORTIC VALVE PEAK VELOCITY: 1.21 M/S
AV PEAK GRADIENT: 5.9 MMHG
AVA (PEAK VEL): 2.13 CM2
AVA (VTI): 1.86 CM2
EJECTION FRACTION APICAL 4 CHAMBER: 55
EJECTION FRACTION: 58 %
LEFT ATRIUM VOLUME AREA LENGTH INDEX BSA: 22.8 ML/M2
LEFT VENTRICLE INTERNAL DIMENSION DIASTOLE: 4.21 CM (ref 3.5–6)
LEFT VENTRICULAR OUTFLOW TRACT DIAMETER: 2 CM
MITRAL VALVE E/A RATIO: 0.77
RIGHT VENTRICLE PEAK SYSTOLIC PRESSURE: 28.6 MMHG
TRICUSPID ANNULAR PLANE SYSTOLIC EXCURSION: 2.8 CM

## 2024-07-11 PROCEDURE — 93306 TTE W/DOPPLER COMPLETE: CPT | Performed by: STUDENT IN AN ORGANIZED HEALTH CARE EDUCATION/TRAINING PROGRAM

## 2024-07-11 PROCEDURE — 2500000004 HC RX 250 GENERAL PHARMACY W/ HCPCS (ALT 636 FOR OP/ED): Performed by: STUDENT IN AN ORGANIZED HEALTH CARE EDUCATION/TRAINING PROGRAM

## 2024-07-11 PROCEDURE — 93306 TTE W/DOPPLER COMPLETE: CPT

## 2024-07-11 NOTE — TELEPHONE ENCOUNTER
Result Communication    Resulted Orders   Transthoracic Echo Complete   Result Value Ref Range    AV pk yasmin 1.21 m/s    AV mn grad 3.0 mmHg    LVOT diam 2.00 cm    MV E/A ratio 0.77     LA vol index A/L 22.8 ml/m2    Tricuspid annular plane systolic excursion 2.8 cm    LV EF 58 %    LVIDd 4.21 cm    RVSP 28.6 mmHg    Aortic Valve Area by Continuity of VTI 1.86 cm2    Aortic Valve Area by Continuity of Peak Velocity 2.13 cm2    AV pk grad 5.9 mmHg    LV A4C EF 55.0     [facility], [address] [place] [address]                Tel [phone] and Fax [phone]    TRANSTHORACIC ECHOCARDIOGRAM REPORT       Patient Name:      NASIR De Physician:    [id number] Pam Sewell MD  Study Date:        7/11/2024            Ordering Provider:    [id number] YESENIA HOOVER  MRN/PID:           [id number]             Fellow:  Accession#:        [id number]         Nurse:                Harper Valera RN  Date of Birth/Age: 1960 / 64 years  Sonographer:          Scarlet Song RDCS  Gender:            F                    Additional Staff:  Height:            149.86 cm            Admit Date:  Weight:            84.37 kg             Admission Status:     Outpatient  BSA / BMI:         1.79 m2 / 37.57      Encounter#:           [id number]                     kg/m2  Blood Pressure:    136/78 mmHg          Department Location:  [facility] Non                                                                Invasive    Study Type:    TRANSTHORACIC ECHO (TTE) COMPLETE  Diagnosis/ICD: Abnormal electrocardiogram [ECG] [EKG]-R94.31  Indication:    Abnormal EKG  CPT Code:      Echo Complete w Full Doppler-66618    Patient History:  Pertinent History: Abnormal EKG, HTN and Hyperlipidemia.    Study  Detail: The following Echo studies were performed: 2D, M-Mode, Doppler and                color flow. Image quality for this study is poor. Technically                challenging study due to poor acoustic windows and prominent lung                artifact. Definity used as a contrast agent for endocardial border                definition. Total contrast used for this procedure was 1.0 mL via                IV push. The patient was awake.       PHYSICIAN INTERPRETATION:  Left Ventricle: The left ventricular systolic function is normal, with a visually estimated ejection fraction of 55-60%. There are no regional wall motion abnormalities. The left ventricular cavity size is normal. The left ventricular septal wall thickness is mildly increased. Spectral Doppler shows an impaired relaxation pattern of left ventricular diastolic filling.  Left Atrium: The left atrium was not well visualized. Probably normal size.  Right Ventricle: The right ventricle is normal in size. There is normal right ventricular global systolic function. RV free wall is not well visualized.  Right Atrium: The right atrium was not well visualized. Probably normal size.  Aortic Valve: The aortic valve is trileaflet. There is mild aortic valve cusp calcification. There is no evidence of aortic valve stenosis. The aortic valve dimensionless index is 0.59. There is no evidence of aortic valve regurgitation. The peak instantaneous gradient of the aortic valve is 5.9 mmHg. The mean gradient of the aortic valve is 3.0 mmHg.  Mitral Valve: The mitral valve is mildly thickened. There is mild mitral annular calcification. There is trace to mild mitral valve regurgitation.  Tricuspid Valve: The tricuspid valve is structurally normal. There is trace to mild tricuspid regurgitation. The Doppler estimated RVSP is within normal limits at 28.6 mmHg.  Pulmonic Valve: The pulmonic valve is structurally normal. There is mild pulmonic valve  regurgitation.  Pericardium: There is no pericardial effusion noted.  Aorta: The aortic root is normal.  Systemic Veins: The inferior vena cava appears to be of normal size. There is IVC inspiratory collapse greater than 50%.  In comparison to the previous echocardiogram(s): There are no prior studies on this patient for comparison purposes.       CONCLUSIONS:   1. The left ventricular systolic function is normal, with a visually estimated ejection fraction of 55-60%.   2. Spectral Doppler shows an impaired relaxation pattern of left ventricular diastolic filling.   3. There is normal right ventricular global systolic function.   4. RVSP within normal limits.    QUANTITATIVE DATA SUMMARY:  2D MEASUREMENTS:                            Normal Ranges:  Ao Root d:     3.00 cm    (2.0-3.7cm)  IVSd:          1.06 cm    (0.6-1.1cm)  LVPWd:         0.99 cm    (0.6-1.1cm)  LVIDd:         4.21 cm    (3.9-5.9cm)  LVIDs:         2.60 cm  LV Mass Index: 100.0 g/m2  LV % FS        38.2 %    LA VOLUME:                                Normal Ranges:  LA Vol A4C:        37.3 ml    (22+/-6mL/m2)  LA Vol A2C:        40.8 ml  LA Vol BP:         40.8 ml  LA Vol Index A4C:  20.9ml/m2  LA Vol Index A2C:  22.8 ml/m2  LA Vol Index BP:   22.8 ml/m2  LA Area A4C:       14.8 cm2  LA Area A2C:       14.8 cm2  LA Major Axis A4C: 5.0 cm  LA Major Axis A2C: 4.6 cm  LA Volume Index:   21.2 ml/m2  LA Vol A4C:        35.3 ml  LA Vol A2C:        37.5 ml    RA VOLUME BY A/L METHOD:                        Normal Ranges:  RA Area A4C: 14.2 cm2    M-MODE MEASUREMENTS:                   Normal Ranges:  Ao Root: 3.10 cm (2.0-3.7cm)  AoV Exc: 1.80 cm (1.5-2.5cm)  LAs:     3.60 cm (2.7-4.0cm)    AORTA MEASUREMENTS:                       Normal Ranges:  AoV Exc:     1.80 cm (1.5-2.5cm)  Ao Sinus, d: 3.00 cm (2.1-3.5cm)  Asc Ao, d:   3.10 cm (2.1-3.4cm)    LV SYSTOLIC FUNCTION BY 2D PLANIMETRY (MOD):                       Normal Ranges:  EF-A4C View:    55 %  (>=55%)  EF-A2C View:    52 %  EF-Biplane:     53 %  EF-Visual:      58 %  LV EF Reported: 58 %    LV DIASTOLIC FUNCTION:                                Normal Ranges:  MV Peak E:        0.63 m/s    (0.7-1.2 m/s)  MV Peak A:        0.81 m/s    (0.42-0.7 m/s)  E/A Ratio:        0.77        (1.0-2.2)  MV e'             0.064 m/s   (>8.0)  MV lateral e'     0.06 m/s  MV medial e'      0.07 m/s  MV A Dur:         119.00 msec  E/e' Ratio:       9.90        (<8.0)  PulmV Sys Bautista:    44.70 cm/s  PulmV Judd Bautista:   28.00 cm/s  PulmV S/D Bautista:    1.60  PulmV A Revs Bautista: 26.60 cm/s  PulmV A Revs Dur: 103.00 msec    MITRAL VALVE:                  Normal Ranges:  MV DT: 184 msec (150-240msec)    AORTIC VALVE:                                    Normal Ranges:  AoV Vmax:                1.21 m/s (<=1.7m/s)  AoV Peak P.9 mmHg (<20mmHg)  AoV Mean PG:             3.0 mmHg (1.7-11.5mmHg)  LVOT Max Bautista:            0.82 m/s (<=1.1m/s)  AoV VTI:                 27.60 cm (18-25cm)  LVOT VTI:                16.30 cm  LVOT Diameter:           2.00 cm  (1.8-2.4cm)  AoV Area, VTI:           1.86 cm2 (2.5-5.5cm2)  AoV Area,Vmax:           2.13 cm2 (2.5-4.5cm2)  AoV Dimensionless Index: 0.59       RIGHT VENTRICLE:  RV Basal 3.01 cm  RV Mid   2.80 cm  RV Major 6.3 cm  TAPSE:   28.2 mm    TRICUSPID VALVE/RVSP:                              Normal Ranges:  Peak TR Velocity: 2.53 m/s  RV Syst Pressure: 28.6 mmHg (< 30mmHg)  IVC Diam:         1.61 cm    PULMONIC VALVE:                       Normal Ranges:  PV Max Bautista: 0.9 m/s  (0.6-0.9m/s)  PV Max PG:  3.1 mmHg    Pulmonary Veins:  PulmV A Revs Dur: 103.00 msec  PulmV A Revs Bautista: 26.60 cm/s  PulmV Judd Bautista:   28.00 cm/s  PulmV S/D Bautista:    1.60  PulmV Sys Bautista:    44.70 cm/s       [id number] Pam Sewell MD  Electronically signed on 2024 at 10:38:09 AM         ** Final **         2:48 PM      LVM to call office for results

## 2024-07-12 ENCOUNTER — TELEPHONE (OUTPATIENT)
Dept: PRIMARY CARE | Facility: CLINIC | Age: 64
End: 2024-07-12
Payer: MEDICARE

## 2024-07-12 NOTE — TELEPHONE ENCOUNTER
----- Message from Fifi KING sent at 7/11/2024  2:58 PM EDT -----  LVM for pt to call in for results  ----- Message -----  From: Socorro Perdomo MD  Sent: 7/11/2024   1:02 PM EDT  To: #    Generally the echocardiogram looks unremarkable, nothing of concern, heart is pumping okay no major valvular disease

## 2024-07-12 NOTE — TELEPHONE ENCOUNTER
Result Communication    Resulted Orders   Transthoracic Echo Complete   Result Value Ref Range    AV pk yasmin 1.21 m/s    AV mn grad 3.0 mmHg    LVOT diam 2.00 cm    MV E/A ratio 0.77     LA vol index A/L 22.8 ml/m2    Tricuspid annular plane systolic excursion 2.8 cm    LV EF 58 %    LVIDd 4.21 cm    RVSP 28.6 mmHg    Aortic Valve Area by Continuity of VTI 1.86 cm2    Aortic Valve Area by Continuity of Peak Velocity 2.13 cm2    AV pk grad 5.9 mmHg    LV A4C EF 55.0     [facility], [address] [place] [address]                Tel [phone] and Fax [phone]    TRANSTHORACIC ECHOCARDIOGRAM REPORT       Patient Name:      NASIR De Physician:    [id number] Pam Sewell MD  Study Date:        7/11/2024            Ordering Provider:    [id number] YESENIA HOOVER  MRN/PID:           [id number]             Fellow:  Accession#:        [id number]         Nurse:                Harper Valera RN  Date of Birth/Age: 1960 / 64 years  Sonographer:          Scarlet Song RDCS  Gender:            F                    Additional Staff:  Height:            149.86 cm            Admit Date:  Weight:            84.37 kg             Admission Status:     Outpatient  BSA / BMI:         1.79 m2 / 37.57      Encounter#:           [id number]                     kg/m2  Blood Pressure:    136/78 mmHg          Department Location:  [facility] [place] Non                                                                Invasive    Study Type:    TRANSTHORACIC ECHO (TTE) COMPLETE  Diagnosis/ICD: Abnormal electrocardiogram [ECG] [EKG]-R94.31  Indication:    Abnormal EKG  CPT Code:      Echo Complete w Full Doppler-80413    Patient History:  Pertinent History: Abnormal EKG, HTN and Hyperlipidemia.    Study  Detail: The following Echo studies were performed: 2D, M-Mode, Doppler and                color flow. Image quality for this study is poor. Technically                challenging study due to poor acoustic windows and prominent lung                artifact. Definity used as a contrast agent for endocardial border                definition. Total contrast used for this procedure was 1.0 mL via                IV push. The patient was awake.       PHYSICIAN INTERPRETATION:  Left Ventricle: The left ventricular systolic function is normal, with a visually estimated ejection fraction of 55-60%. There are no regional wall motion abnormalities. The left ventricular cavity size is normal. The left ventricular septal wall thickness is mildly increased. Spectral Doppler shows an impaired relaxation pattern of left ventricular diastolic filling.  Left Atrium: The left atrium was not well visualized. Probably normal size.  Right Ventricle: The right ventricle is normal in size. There is normal right ventricular global systolic function. RV free wall is not well visualized.  Right Atrium: The right atrium was not well visualized. Probably normal size.  Aortic Valve: The aortic valve is trileaflet. There is mild aortic valve cusp calcification. There is no evidence of aortic valve stenosis. The aortic valve dimensionless index is 0.59. There is no evidence of aortic valve regurgitation. The peak instantaneous gradient of the aortic valve is 5.9 mmHg. The mean gradient of the aortic valve is 3.0 mmHg.  Mitral Valve: The mitral valve is mildly thickened. There is mild mitral annular calcification. There is trace to mild mitral valve regurgitation.  Tricuspid Valve: The tricuspid valve is structurally normal. There is trace to mild tricuspid regurgitation. The Doppler estimated RVSP is within normal limits at 28.6 mmHg.  Pulmonic Valve: The pulmonic valve is structurally normal. There is mild pulmonic valve  regurgitation.  Pericardium: There is no pericardial effusion noted.  Aorta: The aortic root is normal.  Systemic Veins: The inferior vena cava appears to be of normal size. There is IVC inspiratory collapse greater than 50%.  In comparison to the previous echocardiogram(s): There are no prior studies on this patient for comparison purposes.       CONCLUSIONS:   1. The left ventricular systolic function is normal, with a visually estimated ejection fraction of 55-60%.   2. Spectral Doppler shows an impaired relaxation pattern of left ventricular diastolic filling.   3. There is normal right ventricular global systolic function.   4. RVSP within normal limits.    QUANTITATIVE DATA SUMMARY:  2D MEASUREMENTS:                            Normal Ranges:  Ao Root d:     3.00 cm    (2.0-3.7cm)  IVSd:          1.06 cm    (0.6-1.1cm)  LVPWd:         0.99 cm    (0.6-1.1cm)  LVIDd:         4.21 cm    (3.9-5.9cm)  LVIDs:         2.60 cm  LV Mass Index: 100.0 g/m2  LV % FS        38.2 %    LA VOLUME:                                Normal Ranges:  LA Vol A4C:        37.3 ml    (22+/-6mL/m2)  LA Vol A2C:        40.8 ml  LA Vol BP:         40.8 ml  LA Vol Index A4C:  20.9ml/m2  LA Vol Index A2C:  22.8 ml/m2  LA Vol Index BP:   22.8 ml/m2  LA Area A4C:       14.8 cm2  LA Area A2C:       14.8 cm2  LA Major Axis A4C: 5.0 cm  LA Major Axis A2C: 4.6 cm  LA Volume Index:   21.2 ml/m2  LA Vol A4C:        35.3 ml  LA Vol A2C:        37.5 ml    RA VOLUME BY A/L METHOD:                        Normal Ranges:  RA Area A4C: 14.2 cm2    M-MODE MEASUREMENTS:                   Normal Ranges:  Ao Root: 3.10 cm (2.0-3.7cm)  AoV Exc: 1.80 cm (1.5-2.5cm)  LAs:     3.60 cm (2.7-4.0cm)    AORTA MEASUREMENTS:                       Normal Ranges:  AoV Exc:     1.80 cm (1.5-2.5cm)  Ao Sinus, d: 3.00 cm (2.1-3.5cm)  Asc Ao, d:   3.10 cm (2.1-3.4cm)    LV SYSTOLIC FUNCTION BY 2D PLANIMETRY (MOD):                       Normal Ranges:  EF-A4C View:    55 %  (>=55%)  EF-A2C View:    52 %  EF-Biplane:     53 %  EF-Visual:      58 %  LV EF Reported: 58 %    LV DIASTOLIC FUNCTION:                                Normal Ranges:  MV Peak E:        0.63 m/s    (0.7-1.2 m/s)  MV Peak A:        0.81 m/s    (0.42-0.7 m/s)  E/A Ratio:        0.77        (1.0-2.2)  MV e'             0.064 m/s   (>8.0)  MV lateral e'     0.06 m/s  MV medial e'      0.07 m/s  MV A Dur:         119.00 msec  E/e' Ratio:       9.90        (<8.0)  PulmV Sys Bautista:    44.70 cm/s  PulmV Judd Bautista:   28.00 cm/s  PulmV S/D Bautista:    1.60  PulmV A Revs Bautista: 26.60 cm/s  PulmV A Revs Dur: 103.00 msec    MITRAL VALVE:                  Normal Ranges:  MV DT: 184 msec (150-240msec)    AORTIC VALVE:                                    Normal Ranges:  AoV Vmax:                1.21 m/s (<=1.7m/s)  AoV Peak P.9 mmHg (<20mmHg)  AoV Mean PG:             3.0 mmHg (1.7-11.5mmHg)  LVOT Max Bautista:            0.82 m/s (<=1.1m/s)  AoV VTI:                 27.60 cm (18-25cm)  LVOT VTI:                16.30 cm  LVOT Diameter:           2.00 cm  (1.8-2.4cm)  AoV Area, VTI:           1.86 cm2 (2.5-5.5cm2)  AoV Area,Vmax:           2.13 cm2 (2.5-4.5cm2)  AoV Dimensionless Index: 0.59       RIGHT VENTRICLE:  RV Basal 3.01 cm  RV Mid   2.80 cm  RV Major 6.3 cm  TAPSE:   28.2 mm    TRICUSPID VALVE/RVSP:                              Normal Ranges:  Peak TR Velocity: 2.53 m/s  RV Syst Pressure: 28.6 mmHg (< 30mmHg)  IVC Diam:         1.61 cm    PULMONIC VALVE:                       Normal Ranges:  PV Max Bautista: 0.9 m/s  (0.6-0.9m/s)  PV Max PG:  3.1 mmHg    Pulmonary Veins:  PulmV A Revs Dur: 103.00 msec  PulmV A Revs Bautista: 26.60 cm/s  PulmV Judd Bautista:   28.00 cm/s  PulmV S/D Bautista:    1.60  PulmV Sys Bautista:    44.70 cm/s       [id number] Pam Sewell MD  Electronically signed on 2024 at 10:38:09 AM         ** Final **         12:06 PM      Results were successfully communicated with the patient and they acknowledged their  understanding.

## 2024-10-17 DIAGNOSIS — I10 BENIGN HYPERTENSION: ICD-10-CM

## 2024-10-17 RX ORDER — ATENOLOL AND CHLORTHALIDONE TABLET 50; 25 MG/1; MG/1
1 TABLET ORAL DAILY
Qty: 90 TABLET | Refills: 0 | Status: SHIPPED | OUTPATIENT
Start: 2024-10-17

## 2024-10-17 RX ORDER — POTASSIUM CHLORIDE 20 MEQ/1
20 TABLET, EXTENDED RELEASE ORAL DAILY
Qty: 90 TABLET | Refills: 0 | Status: SHIPPED | OUTPATIENT
Start: 2024-10-17

## 2024-10-18 ENCOUNTER — HOSPITAL ENCOUNTER (OUTPATIENT)
Dept: RADIOLOGY | Facility: HOSPITAL | Age: 64
Discharge: HOME | End: 2024-10-18
Payer: MEDICARE

## 2024-10-18 VITALS — WEIGHT: 187 LBS | BODY MASS INDEX: 37.7 KG/M2 | HEIGHT: 59 IN

## 2024-10-18 DIAGNOSIS — Z12.31 ENCOUNTER FOR SCREENING MAMMOGRAM FOR MALIGNANT NEOPLASM OF BREAST: ICD-10-CM

## 2024-10-18 PROCEDURE — 77067 SCR MAMMO BI INCL CAD: CPT | Performed by: RADIOLOGY

## 2024-10-18 PROCEDURE — 77067 SCR MAMMO BI INCL CAD: CPT

## 2024-10-18 PROCEDURE — 77063 BREAST TOMOSYNTHESIS BI: CPT | Performed by: RADIOLOGY

## 2024-11-22 PROBLEM — D75.1 ERYTHROCYTOSIS: Status: ACTIVE | Noted: 2024-11-22

## 2024-11-22 PROBLEM — E66.9 OBESITY WITH BODY MASS INDEX 30 OR GREATER: Status: ACTIVE | Noted: 2024-11-22

## 2024-11-22 RX ORDER — OMEGA-3-ACID ETHYL ESTERS 1 G/1
CAPSULE, LIQUID FILLED ORAL
COMMUNITY

## 2024-11-22 RX ORDER — GLUCOSAMINE/CHONDR SU A SOD 250-200 MG
TABLET ORAL
COMMUNITY

## 2024-11-25 ENCOUNTER — APPOINTMENT (OUTPATIENT)
Dept: PRIMARY CARE | Facility: CLINIC | Age: 64
End: 2024-11-25
Payer: MEDICARE

## 2024-11-25 ENCOUNTER — LAB (OUTPATIENT)
Dept: LAB | Facility: LAB | Age: 64
End: 2024-11-25
Payer: MEDICARE

## 2024-11-25 VITALS
DIASTOLIC BLOOD PRESSURE: 86 MMHG | TEMPERATURE: 98 F | OXYGEN SATURATION: 96 % | BODY MASS INDEX: 37.29 KG/M2 | SYSTOLIC BLOOD PRESSURE: 126 MMHG | WEIGHT: 185 LBS | HEIGHT: 59 IN | HEART RATE: 57 BPM

## 2024-11-25 DIAGNOSIS — R73.9 HYPERGLYCEMIA: ICD-10-CM

## 2024-11-25 DIAGNOSIS — E66.01 MORBID OBESITY (MULTI): ICD-10-CM

## 2024-11-25 DIAGNOSIS — I10 BENIGN HYPERTENSION: ICD-10-CM

## 2024-11-25 DIAGNOSIS — M85.89 OSTEOPENIA OF MULTIPLE SITES: ICD-10-CM

## 2024-11-25 DIAGNOSIS — E55.9 VITAMIN D DEFICIENCY: ICD-10-CM

## 2024-11-25 DIAGNOSIS — E78.2 MIXED HYPERLIPIDEMIA: ICD-10-CM

## 2024-11-25 DIAGNOSIS — E55.9 VITAMIN D DEFICIENCY: Primary | ICD-10-CM

## 2024-11-25 PROBLEM — D75.1 POLYCYTHEMIA: Status: RESOLVED | Noted: 2023-03-30 | Resolved: 2024-11-25

## 2024-11-25 PROBLEM — D58.2 ELEVATED HEMOGLOBIN (CMS-HCC): Status: RESOLVED | Noted: 2023-03-30 | Resolved: 2024-11-25

## 2024-11-25 PROBLEM — M81.0 OSTEOPOROSIS: Status: RESOLVED | Noted: 2023-03-30 | Resolved: 2024-11-25

## 2024-11-25 PROBLEM — D75.1 ERYTHROCYTOSIS: Status: RESOLVED | Noted: 2024-11-22 | Resolved: 2024-11-25

## 2024-11-25 LAB
ALBUMIN SERPL BCP-MCNC: 4.3 G/DL (ref 3.4–5)
ALP SERPL-CCNC: 44 U/L (ref 33–136)
ALT SERPL W P-5'-P-CCNC: 37 U/L (ref 7–45)
ANION GAP SERPL CALC-SCNC: 15 MMOL/L (ref 10–20)
AST SERPL W P-5'-P-CCNC: 25 U/L (ref 9–39)
BASOPHILS # BLD AUTO: 0.05 X10*3/UL (ref 0–0.1)
BASOPHILS NFR BLD AUTO: 0.6 %
BILIRUB SERPL-MCNC: 0.7 MG/DL (ref 0–1.2)
BUN SERPL-MCNC: 15 MG/DL (ref 6–23)
CALCIUM SERPL-MCNC: 9.8 MG/DL (ref 8.6–10.3)
CHLORIDE SERPL-SCNC: 100 MMOL/L (ref 98–107)
CHOLEST SERPL-MCNC: 210 MG/DL (ref 0–199)
CHOLESTEROL/HDL RATIO: 5.7
CO2 SERPL-SCNC: 28 MMOL/L (ref 21–32)
CREAT SERPL-MCNC: 0.73 MG/DL (ref 0.5–1.05)
EGFRCR SERPLBLD CKD-EPI 2021: >90 ML/MIN/1.73M*2
EOSINOPHIL # BLD AUTO: 0.32 X10*3/UL (ref 0–0.7)
EOSINOPHIL NFR BLD AUTO: 4 %
ERYTHROCYTE [DISTWIDTH] IN BLOOD BY AUTOMATED COUNT: 13.2 % (ref 11.5–14.5)
GLUCOSE SERPL-MCNC: 93 MG/DL (ref 74–99)
HCT VFR BLD AUTO: 47.4 % (ref 36–46)
HDLC SERPL-MCNC: 36.6 MG/DL
HGB BLD-MCNC: 16 G/DL (ref 12–16)
IMM GRANULOCYTES # BLD AUTO: 0.02 X10*3/UL (ref 0–0.7)
IMM GRANULOCYTES NFR BLD AUTO: 0.3 % (ref 0–0.9)
LDLC SERPL CALC-MCNC: 144 MG/DL
LYMPHOCYTES # BLD AUTO: 2.74 X10*3/UL (ref 1.2–4.8)
LYMPHOCYTES NFR BLD AUTO: 34.3 %
MCH RBC QN AUTO: 28.7 PG (ref 26–34)
MCHC RBC AUTO-ENTMCNC: 33.8 G/DL (ref 32–36)
MCV RBC AUTO: 85 FL (ref 80–100)
MONOCYTES # BLD AUTO: 0.51 X10*3/UL (ref 0.1–1)
MONOCYTES NFR BLD AUTO: 6.4 %
NEUTROPHILS # BLD AUTO: 4.35 X10*3/UL (ref 1.2–7.7)
NEUTROPHILS NFR BLD AUTO: 54.4 %
NON HDL CHOLESTEROL: 173 MG/DL (ref 0–149)
NRBC BLD-RTO: 0 /100 WBCS (ref 0–0)
PLATELET # BLD AUTO: 264 X10*3/UL (ref 150–450)
POTASSIUM SERPL-SCNC: 3.4 MMOL/L (ref 3.5–5.3)
PROT SERPL-MCNC: 7.3 G/DL (ref 6.4–8.2)
RBC # BLD AUTO: 5.57 X10*6/UL (ref 4–5.2)
SODIUM SERPL-SCNC: 140 MMOL/L (ref 136–145)
TRIGL SERPL-MCNC: 146 MG/DL (ref 0–149)
TSH SERPL-ACNC: 1.98 MIU/L (ref 0.44–3.98)
VLDL: 29 MG/DL (ref 0–40)
WBC # BLD AUTO: 8 X10*3/UL (ref 4.4–11.3)

## 2024-11-25 PROCEDURE — 84443 ASSAY THYROID STIM HORMONE: CPT

## 2024-11-25 PROCEDURE — 1160F RVW MEDS BY RX/DR IN RCRD: CPT | Performed by: FAMILY MEDICINE

## 2024-11-25 PROCEDURE — 3079F DIAST BP 80-89 MM HG: CPT | Performed by: FAMILY MEDICINE

## 2024-11-25 PROCEDURE — 36415 COLL VENOUS BLD VENIPUNCTURE: CPT

## 2024-11-25 PROCEDURE — 80053 COMPREHEN METABOLIC PANEL: CPT

## 2024-11-25 PROCEDURE — 1158F ADVNC CARE PLAN TLK DOCD: CPT | Performed by: FAMILY MEDICINE

## 2024-11-25 PROCEDURE — 3008F BODY MASS INDEX DOCD: CPT | Performed by: FAMILY MEDICINE

## 2024-11-25 PROCEDURE — G2211 COMPLEX E/M VISIT ADD ON: HCPCS | Performed by: FAMILY MEDICINE

## 2024-11-25 PROCEDURE — 3074F SYST BP LT 130 MM HG: CPT | Performed by: FAMILY MEDICINE

## 2024-11-25 PROCEDURE — 82306 VITAMIN D 25 HYDROXY: CPT

## 2024-11-25 PROCEDURE — 1036F TOBACCO NON-USER: CPT | Performed by: FAMILY MEDICINE

## 2024-11-25 PROCEDURE — 80061 LIPID PANEL: CPT

## 2024-11-25 PROCEDURE — 99214 OFFICE O/P EST MOD 30 MIN: CPT | Performed by: FAMILY MEDICINE

## 2024-11-25 PROCEDURE — 1159F MED LIST DOCD IN RCRD: CPT | Performed by: FAMILY MEDICINE

## 2024-11-25 PROCEDURE — 85025 COMPLETE CBC W/AUTO DIFF WBC: CPT

## 2024-11-25 PROCEDURE — 1123F ACP DISCUSS/DSCN MKR DOCD: CPT | Performed by: FAMILY MEDICINE

## 2024-11-25 RX ORDER — ATENOLOL AND CHLORTHALIDONE TABLET 50; 25 MG/1; MG/1
1 TABLET ORAL DAILY
Qty: 90 TABLET | Refills: 1 | Status: SHIPPED | OUTPATIENT
Start: 2024-11-25 | End: 2024-11-25

## 2024-11-25 RX ORDER — POTASSIUM CHLORIDE 20 MEQ/1
20 TABLET, EXTENDED RELEASE ORAL DAILY
Qty: 90 TABLET | Refills: 1 | Status: SHIPPED | OUTPATIENT
Start: 2024-11-25 | End: 2024-11-26 | Stop reason: SDUPTHER

## 2024-11-25 RX ORDER — ATENOLOL AND CHLORTHALIDONE TABLET 50; 25 MG/1; MG/1
1 TABLET ORAL DAILY
Qty: 90 TABLET | Refills: 1 | Status: SHIPPED | OUTPATIENT
Start: 2024-11-25

## 2024-11-25 ASSESSMENT — ENCOUNTER SYMPTOMS: SHORTNESS OF BREATH: 0

## 2024-11-25 ASSESSMENT — PATIENT HEALTH QUESTIONNAIRE - PHQ9
2. FEELING DOWN, DEPRESSED OR HOPELESS: NOT AT ALL
1. LITTLE INTEREST OR PLEASURE IN DOING THINGS: NOT AT ALL
SUM OF ALL RESPONSES TO PHQ9 QUESTIONS 1 AND 2: 0

## 2024-11-25 NOTE — PATIENT INSTRUCTIONS
Get your blood work as ordered.  You should hear from our office with results whether they are normal are not within a few days.  Please call the office if you do not hear from us.     Hypertension Plan:  You should check your blood pressures 2-3 times per month.  Your goal should be systolic (upper number ) < 140, and diastolic (bottom number) < 90.  Please periodically inform office of your BP numbers.  You should follow a low salt diet and exercise routinely.  It is important that you keep your weight under control.  With hypertension you should be seen in the office at least twice per year.      You should be getting cardiovascular exercise 3-5 times per week for 30-45 minutes.  This includes exercises such as running, brisk walking, biking or swimming.         It is important to actively try to reduce your weight to become healthier.  There are several things you can do to try and lose weight.  You should be getting 30-45 minutes of cardiovascular exercise 3-5 times per week, activities such as running and jogging treadmill swimming and brisk walking are helpful.  You will also need to watch your portion control, decrease calorie intake overall.  Following a low carbohydrate diet is the most successful way to lose weight and take it off long-term.  In order to achieve weight loss it is important that you track exactly what your calorie intake is during the day.  I usually recommend doing some sort of formal program or Coleen. there are lot of good Apps out there to help you follow your calorie intake such as weight watchers, Noom, Fitbit.  It is recommended that you reduce the intake of sweets, sugar, reduce carbohydrate intake.  Healthy diets with more whole grains, vegetables, fruits, nuts and seeds with plant oils are healthier diets than animal-based fats.  Reduce your intake of white bread, grains, potatoes, processed foods.

## 2024-11-25 NOTE — PROGRESS NOTES
"Subjective   Patient ID: Jamar Ibarra is a 64 y.o. female who presents for Follow-up. Doing well. No complaints.      Hypertension  : Patient is taking blood pressure medications as directed.  Blood pressures have been averagin-133/65-87     Pt denies Chest Pain or Shortness of Breath    Some exercise       Coronary calcium score a few years ago looked okay  Hyperglycemia     Elevated cholesterol      Osteopenia      Colonoscopy in   :  age 59               Review of Systems   Respiratory:  Negative for shortness of breath.    Cardiovascular:  Negative for chest pain.       Objective   /86   Pulse 57   Temp 36.7 °C (98 °F)   Ht 1.499 m (4' 11\")   Wt 83.9 kg (185 lb)   SpO2 96%   BMI 37.37 kg/m²     Physical Exam  Constitutional:       Appearance: Normal appearance. She is well-developed.   Cardiovascular:      Rate and Rhythm: Normal rate and regular rhythm.      Heart sounds: Normal heart sounds. No murmur heard.  Pulmonary:      Effort: Pulmonary effort is normal.      Breath sounds: Normal breath sounds.   Abdominal:      General: Abdomen is flat.      Palpations: Abdomen is soft.   Neurological:      General: No focal deficit present.      Mental Status: She is alert.   Psychiatric:         Mood and Affect: Mood normal.         Assessment/Plan   Problem List Items Addressed This Visit             ICD-10-CM    Benign hypertension I10    Relevant Medications    potassium chloride CR 20 mEq ER tablet    atenoloL-chlorthalidone (Tenoretic) 50-25 mg tablet    Other Relevant Orders    CBC and Auto Differential    Comprehensive Metabolic Panel    Lipid Panel    Thyroid Stimulating Hormone    Vitamin D 25-Hydroxy,Total (for eval of Vitamin D levels)    Hyperglycemia R73.9    Relevant Orders    CBC and Auto Differential    Comprehensive Metabolic Panel    Lipid Panel    Thyroid Stimulating Hormone    Vitamin D 25-Hydroxy,Total (for eval of Vitamin D levels)    Morbid obesity (Multi) E66.01    " Osteopenia M85.80    Relevant Orders    CBC and Auto Differential    Comprehensive Metabolic Panel    Lipid Panel    Thyroid Stimulating Hormone    Vitamin D 25-Hydroxy,Total (for eval of Vitamin D levels)    Vitamin D deficiency - Primary E55.9    Relevant Orders    CBC and Auto Differential    Comprehensive Metabolic Panel    Lipid Panel    Thyroid Stimulating Hormone    Vitamin D 25-Hydroxy,Total (for eval of Vitamin D levels)    Mixed hyperlipidemia E78.2    Relevant Orders    CBC and Auto Differential    Comprehensive Metabolic Panel    Lipid Panel    Thyroid Stimulating Hormone    Vitamin D 25-Hydroxy,Total (for eval of Vitamin D levels)

## 2024-11-26 ENCOUNTER — TELEPHONE (OUTPATIENT)
Dept: PRIMARY CARE | Facility: CLINIC | Age: 64
End: 2024-11-26
Payer: MEDICARE

## 2024-11-26 DIAGNOSIS — I10 BENIGN HYPERTENSION: ICD-10-CM

## 2024-11-26 LAB — 25(OH)D3 SERPL-MCNC: 58 NG/ML (ref 30–100)

## 2024-11-26 RX ORDER — POTASSIUM CHLORIDE 20 MEQ/1
20 TABLET, EXTENDED RELEASE ORAL 2 TIMES DAILY
Qty: 180 TABLET | Refills: 1 | Status: SHIPPED | OUTPATIENT
Start: 2024-11-26

## 2024-11-26 NOTE — TELEPHONE ENCOUNTER
Result Communication    Resulted Orders   CBC and Auto Differential   Result Value Ref Range    WBC 8.0 4.4 - 11.3 x10*3/uL    nRBC 0.0 0.0 - 0.0 /100 WBCs    RBC 5.57 (H) 4.00 - 5.20 x10*6/uL    Hemoglobin 16.0 12.0 - 16.0 g/dL    Hematocrit 47.4 (H) 36.0 - 46.0 %    MCV 85 80 - 100 fL    MCH 28.7 26.0 - 34.0 pg    MCHC 33.8 32.0 - 36.0 g/dL    RDW 13.2 11.5 - 14.5 %    Platelets 264 150 - 450 x10*3/uL    Neutrophils % 54.4 40.0 - 80.0 %    Immature Granulocytes %, Automated 0.3 0.0 - 0.9 %      Comment:      Immature Granulocyte Count (IG) includes promyelocytes, myelocytes and metamyelocytes but does not include bands. Percent differential counts (%) should be interpreted in the context of the absolute cell counts (cells/UL).    Lymphocytes % 34.3 13.0 - 44.0 %    Monocytes % 6.4 2.0 - 10.0 %    Eosinophils % 4.0 0.0 - 6.0 %    Basophils % 0.6 0.0 - 2.0 %    Neutrophils Absolute 4.35 1.20 - 7.70 x10*3/uL      Comment:      Percent differential counts (%) should be interpreted in the context of the absolute cell counts (cells/uL).    Immature Granulocytes Absolute, Automated 0.02 0.00 - 0.70 x10*3/uL    Lymphocytes Absolute 2.74 1.20 - 4.80 x10*3/uL    Monocytes Absolute 0.51 0.10 - 1.00 x10*3/uL    Eosinophils Absolute 0.32 0.00 - 0.70 x10*3/uL    Basophils Absolute 0.05 0.00 - 0.10 x10*3/uL   Comprehensive Metabolic Panel   Result Value Ref Range    Glucose 93 74 - 99 mg/dL    Sodium 140 136 - 145 mmol/L    Potassium 3.4 (L) 3.5 - 5.3 mmol/L    Chloride 100 98 - 107 mmol/L    Bicarbonate 28 21 - 32 mmol/L    Anion Gap 15 10 - 20 mmol/L    Urea Nitrogen 15 6 - 23 mg/dL    Creatinine 0.73 0.50 - 1.05 mg/dL    eGFR >90 >60 mL/min/1.73m*2      Comment:      Calculations of estimated GFR are performed using the 2021 CKD-EPI Study Refit equation without the race variable for the IDMS-Traceable creatinine methods.  https://jasn.asnjournals.org/content/early/2021/09/22/ASN.0005215733    Calcium 9.8 8.6 - 10.3 mg/dL     Albumin 4.3 3.4 - 5.0 g/dL    Alkaline Phosphatase 44 33 - 136 U/L    Total Protein 7.3 6.4 - 8.2 g/dL    AST 25 9 - 39 U/L    Bilirubin, Total 0.7 0.0 - 1.2 mg/dL    ALT 37 7 - 45 U/L      Comment:      Patients treated with Sulfasalazine may generate falsely decreased results for ALT.   Lipid Panel   Result Value Ref Range    Cholesterol 210 (H) 0 - 199 mg/dL      Comment:            Age      Desirable   Borderline High   High     0-19 Y     0 - 169       170 - 199     >/= 200    20-24 Y     0 - 189       190 - 224     >/= 225         >24 Y     0 - 199       200 - 239     >/= 240   **All ranges are based on fasting samples. Specific   therapeutic targets will vary based on patient-specific   cardiac risk.    Pediatric guidelines reference:Pediatrics 2011, 128(S5).Adult guidelines reference: NCEP ATPIII Guidelines,GOGO 2001, 258:2486-97    Venipuncture immediately after or during the administration of Metamizole may lead to falsely low results. Testing should be performed immediately prior to Metamizole dosing.    HDL-Cholesterol 36.6 mg/dL      Comment:        Age       Very Low   Low     Normal    High    0-19 Y    < 35      < 40     40-45     ----  20-24 Y    ----     < 40      >45      ----        >24 Y      ----     < 40     40-60      >60      Cholesterol/HDL Ratio 5.7       Comment:        Ref Values  Desirable  < 3.4  High Risk  > 5.0    LDL Calculated 144 (H) <=99 mg/dL      Comment:                                  Near   Borderline      AGE      Desirable  Optimal    High     High     Very High     0-19 Y     0 - 109     ---    110-129   >/= 130     ----    20-24 Y     0 - 119     ---    120-159   >/= 160     ----      >24 Y     0 -  99   100-129  130-159   160-189     >/=190      VLDL 29 0 - 40 mg/dL    Triglycerides 146 0 - 149 mg/dL      Comment:      Age              Desirable        Borderline         High        Very High  SEX:B           mg/dL             mg/dL               mg/dL       mg/dL  <=14D                       ----               ----        ----  15D-365D                    ----               ----        ----  1Y-9Y           0-74               75-99             >=100       ----  10Y-19Y        0-89                            >=130       ----  20Y-24Y        0-114             115-149             >=150      ----  >= 25Y         0-149             150-199             200-499    >=500      Venipuncture immediately after or during the administration of Metamizole may lead to falsely low results. Testing should be performed immediately prior to Metamizole dosing.    Non HDL Cholesterol 173 (H) 0 - 149 mg/dL      Comment:            Age       Desirable   Borderline High   High     Very High     0-19 Y     0 - 119       120 - 144     >/= 145    >/= 160    20-24 Y     0 - 149       150 - 189     >/= 190      ----         >24 Y    30 mg/dL above LDL Cholesterol goal     Thyroid Stimulating Hormone   Result Value Ref Range    Thyroid Stimulating Hormone 1.98 0.44 - 3.98 mIU/L    Narrative    TSH testing is performed using different testing methodology at [facility] than at other [facility]. Direct result comparisons should only be made within the same method.     Vitamin D 25-Hydroxy,Total (for eval of Vitamin D levels)   Result Value Ref Range    Vitamin D, 25-Hydroxy, Total 58 30 - 100 ng/mL    Narrative    Deficiency:         < 20   ng/ml  Insufficiency:      20-29  ng/ml  Sufficiency:         ng/ml  This assay accurately quantifies the sum of Vitamin D3, 25-Hydroxy and Vitamin D2,25-Hydroxy.       5:13 PM  Socorro Perdomo MD  P  [place] [id number] Clinical Support Staff  Potassium is a little low recommend increasing potassium to 2 pills/day, repeat blood work in 2 weeks  Your cholesterol was mildly elevated.  I would recommend regular exercise, keep your weight under control, and follow a low-fat, low-cholesterol diet.  Plant-based diets are  ideal for reducing cholesterol and heart disease risk.  A diet high in fruits and vegetables, legumes, nuts, whole grains and fish with minimal amounts of animal products is recommended.  I would also suggest adding on a Fish oil supplement daily. I recommend repeat a fasting lipid profile in one year.  Rest of blood work normal    Results were successfully communicated with the patient and they acknowledged their understanding.

## 2024-11-26 NOTE — TELEPHONE ENCOUNTER
----- Message from Socorro Perdomo sent at 11/26/2024  8:31 AM EST -----  Potassium is a little low recommend increasing potassium to 2 pills/day, repeat blood work in 2 weeks  Your cholesterol was mildly elevated.  I would recommend regular exercise, keep your weight under control, and follow a low-fat, low-cholesterol diet.  Plant-based diets are ideal for reducing cholesterol and heart disease risk.  A diet high in fruits and vegetables, legumes, nuts, whole grains and fish with minimal amounts of animal products is recommended.  I would also suggest adding on a Fish oil supplement daily. I recommend repeat a fasting lipid profile in one year.  Rest of blood work normal

## 2024-12-10 ENCOUNTER — LAB (OUTPATIENT)
Dept: LAB | Facility: LAB | Age: 64
End: 2024-12-10
Payer: MEDICARE

## 2024-12-10 DIAGNOSIS — I10 BENIGN HYPERTENSION: ICD-10-CM

## 2024-12-10 LAB — POTASSIUM SERPL-SCNC: 3.9 MMOL/L (ref 3.5–5.3)

## 2024-12-10 PROCEDURE — 36415 COLL VENOUS BLD VENIPUNCTURE: CPT

## 2024-12-10 PROCEDURE — 84132 ASSAY OF SERUM POTASSIUM: CPT

## 2024-12-12 ENCOUNTER — TELEPHONE (OUTPATIENT)
Dept: PRIMARY CARE | Facility: CLINIC | Age: 64
End: 2024-12-12
Payer: MEDICARE

## 2024-12-12 NOTE — TELEPHONE ENCOUNTER
Result Communication    Resulted Orders   Potassium   Result Value Ref Range    Potassium 3.9 3.5 - 5.3 mmol/L       9:56 AM  Socorro Perdomo MD  [address] [place] [id number] Clinical Support Staff  Labs are normal, potassium is better , continue same meds    LVM. CA

## 2024-12-12 NOTE — TELEPHONE ENCOUNTER
----- Message from Socorro Perdomo sent at 12/11/2024  8:43 AM EST -----  Labs are normal, potassium is better , continue same meds

## 2025-05-19 DIAGNOSIS — I10 BENIGN HYPERTENSION: ICD-10-CM

## 2025-05-20 RX ORDER — POTASSIUM CHLORIDE 20 MEQ/1
20 TABLET, EXTENDED RELEASE ORAL 2 TIMES DAILY
Qty: 180 TABLET | Refills: 0 | Status: SHIPPED | OUTPATIENT
Start: 2025-05-20

## 2025-06-20 ENCOUNTER — TELEPHONE (OUTPATIENT)
Dept: PRIMARY CARE | Facility: CLINIC | Age: 65
End: 2025-06-20
Payer: MEDICARE

## 2025-06-20 DIAGNOSIS — I10 BENIGN HYPERTENSION: ICD-10-CM

## 2025-06-20 NOTE — TELEPHONE ENCOUNTER
Medication Refill    Pharmacy: [ [facility] ]    Potassium Chloride CR 20 mEq (90 day)     Atenolol- chlorthalidone 50-25 mg (90 day)    LOV: [ 11/25/24 ]    NOV: [ 07/25/25 ]

## 2025-06-22 RX ORDER — ATENOLOL AND CHLORTHALIDONE TABLET 50; 25 MG/1; MG/1
1 TABLET ORAL DAILY
Qty: 90 TABLET | Refills: 0 | Status: SHIPPED | OUTPATIENT
Start: 2025-06-22

## 2025-06-22 RX ORDER — POTASSIUM CHLORIDE 20 MEQ/1
20 TABLET, EXTENDED RELEASE ORAL 2 TIMES DAILY
Qty: 180 TABLET | Refills: 0 | Status: SHIPPED | OUTPATIENT
Start: 2025-06-22

## 2025-07-22 DIAGNOSIS — I10 BENIGN HYPERTENSION: ICD-10-CM

## 2025-07-22 RX ORDER — ATENOLOL AND CHLORTHALIDONE TABLET 50; 25 MG/1; MG/1
1 TABLET ORAL DAILY
Qty: 90 TABLET | Refills: 0 | Status: SHIPPED | OUTPATIENT
Start: 2025-07-22 | End: 2025-07-25 | Stop reason: SDUPTHER

## 2025-07-25 ENCOUNTER — APPOINTMENT (OUTPATIENT)
Dept: PRIMARY CARE | Facility: CLINIC | Age: 65
End: 2025-07-25
Payer: MEDICARE

## 2025-07-25 VITALS
WEIGHT: 182 LBS | TEMPERATURE: 97.9 F | HEIGHT: 59 IN | BODY MASS INDEX: 36.69 KG/M2 | HEART RATE: 58 BPM | DIASTOLIC BLOOD PRESSURE: 76 MMHG | SYSTOLIC BLOOD PRESSURE: 136 MMHG | OXYGEN SATURATION: 93 %

## 2025-07-25 DIAGNOSIS — Z78.0 MENOPAUSE: ICD-10-CM

## 2025-07-25 DIAGNOSIS — Z12.31 ENCOUNTER FOR SCREENING MAMMOGRAM FOR MALIGNANT NEOPLASM OF BREAST: ICD-10-CM

## 2025-07-25 DIAGNOSIS — Z13.820 SCREENING FOR OSTEOPOROSIS: ICD-10-CM

## 2025-07-25 DIAGNOSIS — I10 BENIGN HYPERTENSION: ICD-10-CM

## 2025-07-25 DIAGNOSIS — Z00.00 ROUTINE GENERAL MEDICAL EXAMINATION AT HEALTH CARE FACILITY: Primary | ICD-10-CM

## 2025-07-25 DIAGNOSIS — R73.9 HYPERGLYCEMIA: ICD-10-CM

## 2025-07-25 DIAGNOSIS — M85.89 OSTEOPENIA OF MULTIPLE SITES: ICD-10-CM

## 2025-07-25 DIAGNOSIS — E66.01 MORBID OBESITY (MULTI): ICD-10-CM

## 2025-07-25 PROCEDURE — 3078F DIAST BP <80 MM HG: CPT | Performed by: FAMILY MEDICINE

## 2025-07-25 PROCEDURE — 1170F FXNL STATUS ASSESSED: CPT | Performed by: FAMILY MEDICINE

## 2025-07-25 PROCEDURE — G0439 PPPS, SUBSEQ VISIT: HCPCS | Performed by: FAMILY MEDICINE

## 2025-07-25 PROCEDURE — 1159F MED LIST DOCD IN RCRD: CPT | Performed by: FAMILY MEDICINE

## 2025-07-25 PROCEDURE — 1036F TOBACCO NON-USER: CPT | Performed by: FAMILY MEDICINE

## 2025-07-25 PROCEDURE — 1124F ACP DISCUSS-NO DSCNMKR DOCD: CPT | Performed by: FAMILY MEDICINE

## 2025-07-25 PROCEDURE — 3008F BODY MASS INDEX DOCD: CPT | Performed by: FAMILY MEDICINE

## 2025-07-25 PROCEDURE — 99397 PER PM REEVAL EST PAT 65+ YR: CPT | Performed by: FAMILY MEDICINE

## 2025-07-25 PROCEDURE — 99497 ADVNCD CARE PLAN 30 MIN: CPT | Performed by: FAMILY MEDICINE

## 2025-07-25 PROCEDURE — 99214 OFFICE O/P EST MOD 30 MIN: CPT | Performed by: FAMILY MEDICINE

## 2025-07-25 PROCEDURE — 3075F SYST BP GE 130 - 139MM HG: CPT | Performed by: FAMILY MEDICINE

## 2025-07-25 PROCEDURE — 1160F RVW MEDS BY RX/DR IN RCRD: CPT | Performed by: FAMILY MEDICINE

## 2025-07-25 RX ORDER — POTASSIUM CHLORIDE 20 MEQ/1
20 TABLET, EXTENDED RELEASE ORAL 2 TIMES DAILY
Qty: 180 TABLET | Refills: 1 | Status: SHIPPED | OUTPATIENT
Start: 2025-07-25

## 2025-07-25 RX ORDER — ATENOLOL AND CHLORTHALIDONE TABLET 50; 25 MG/1; MG/1
1 TABLET ORAL DAILY
Qty: 90 TABLET | Refills: 1 | Status: SHIPPED | OUTPATIENT
Start: 2025-07-25

## 2025-07-25 ASSESSMENT — ACTIVITIES OF DAILY LIVING (ADL)
DOING_HOUSEWORK: INDEPENDENT
GROCERY_SHOPPING: INDEPENDENT
DRESSING: INDEPENDENT
BATHING: INDEPENDENT
MANAGING_FINANCES: INDEPENDENT
TAKING_MEDICATION: INDEPENDENT

## 2025-07-25 NOTE — ASSESSMENT & PLAN NOTE
Orders:    atenoloL-chlorthalidone (Tenoretic) 50-25 mg tablet; Take 1 tablet by mouth once daily.    potassium chloride CR 20 mEq ER tablet; Take 1 tablet (20 mEq) by mouth 2 times a day.    Comprehensive Metabolic Panel; Future    CBC and Auto Differential; Future    Lipid Panel; Future    Vitamin D 25-Hydroxy,Total (for eval of Vitamin D levels); Future

## 2025-07-25 NOTE — PROGRESS NOTES
"Subjective   Reason for Visit: Jamar Ibarra is an 65 y.o. female here for a Medicare Wellness visit.     Past Medical, Surgical, and Family History reviewed and updated in chart.    Reviewed all medications by prescribing practitioner or clinical pharmacist (such as prescriptions, OTCs, herbal therapies and supplements) and documented in the medical record.      Pt is here for a MWV; requesting orders for mammogram and DEXA. Fasting for labs    Hypertension  : Patient is taking blood pressure medications as directed.  Blood pressures have been averagin-138/666-81     Pt denies Chest Pain or Shortness of Breath    Some exercise       Coronary calcium score a few years ago looked okay  Hyperglycemia     Elevated cholesterol      Osteopenia is exercising but not a lot doing weightbearing     Colonoscopy in   :  age 59           Patient Care Team:  Socorro Perdomo MD as PCP - General (Family Medicine)  Socorro Perdomo MD     Review of Systems    Objective   Vitals:  /76   Pulse 58   Temp 36.6 °C (97.9 °F)   Ht (!) 1.499 m (4' 11\")   Wt 82.6 kg (182 lb)   SpO2 93%   BMI 36.76 kg/m²       Physical Exam  Constitutional:       General: She is not in acute distress.     Appearance: Normal appearance.   HENT:      Head: Normocephalic and atraumatic.      Right Ear: Tympanic membrane, ear canal and external ear normal.      Left Ear: Tympanic membrane, ear canal and external ear normal.      Mouth/Throat:      Mouth: Mucous membranes are moist.      Pharynx: No oropharyngeal exudate or posterior oropharyngeal erythema.     Eyes:      Extraocular Movements: Extraocular movements intact.      Conjunctiva/sclera: Conjunctivae normal.      Pupils: Pupils are equal, round, and reactive to light.       Cardiovascular:      Rate and Rhythm: Normal rate and regular rhythm.      Heart sounds: No murmur heard.  Pulmonary:      Effort: Pulmonary effort is normal.      Breath sounds: Normal breath sounds. "   Abdominal:      General: Bowel sounds are normal. There is no distension.      Palpations: Abdomen is soft.      Comments: Normal Bowel Sounds     Musculoskeletal:         General: Normal range of motion.      Cervical back: No rigidity.   Lymphadenopathy:      Cervical: No cervical adenopathy.     Skin:     General: Skin is warm and dry.      Findings: No rash.     Neurological:      General: No focal deficit present.      Mental Status: She is alert and oriented to person, place, and time.      Cranial Nerves: No cranial nerve deficit.      Gait: Gait normal.     Psychiatric:         Mood and Affect: Mood normal.         Behavior: Behavior normal.         Assessment & Plan  Benign hypertension    Orders:    atenoloL-chlorthalidone (Tenoretic) 50-25 mg tablet; Take 1 tablet by mouth once daily.    potassium chloride CR 20 mEq ER tablet; Take 1 tablet (20 mEq) by mouth 2 times a day.    Comprehensive Metabolic Panel; Future    CBC and Auto Differential; Future    Lipid Panel; Future    Vitamin D 25-Hydroxy,Total (for eval of Vitamin D levels); Future    Routine general medical examination at health care facility    Orders:    1 Year Follow Up In Primary Care - Wellness Exam; Future    Comprehensive Metabolic Panel; Future    CBC and Auto Differential; Future    Lipid Panel; Future    Vitamin D 25-Hydroxy,Total (for eval of Vitamin D levels); Future    Hyperglycemia    Orders:    Comprehensive Metabolic Panel; Future    CBC and Auto Differential; Future    Lipid Panel; Future    Vitamin D 25-Hydroxy,Total (for eval of Vitamin D levels); Future    Morbid obesity (Multi)    Orders:    Comprehensive Metabolic Panel; Future    CBC and Auto Differential; Future    Lipid Panel; Future    Vitamin D 25-Hydroxy,Total (for eval of Vitamin D levels); Future    Osteopenia of multiple sites    Orders:    Comprehensive Metabolic Panel; Future    CBC and Auto Differential; Future    Lipid Panel; Future    Vitamin D  25-Hydroxy,Total (for eval of Vitamin D levels); Future    Encounter for screening mammogram for malignant neoplasm of breast    Orders:    BI mammo bilateral screening tomosynthesis; Future    Comprehensive Metabolic Panel; Future    CBC and Auto Differential; Future    Lipid Panel; Future    Vitamin D 25-Hydroxy,Total (for eval of Vitamin D levels); Future    Menopause    Orders:    XR DEXA bone density; Future    Comprehensive Metabolic Panel; Future    CBC and Auto Differential; Future    Lipid Panel; Future    Vitamin D 25-Hydroxy,Total (for eval of Vitamin D levels); Future    Screening for osteoporosis    Orders:    XR DEXA bone density; Future    Comprehensive Metabolic Panel; Future    CBC and Auto Differential; Future    Lipid Panel; Future    Vitamin D 25-Hydroxy,Total (for eval of Vitamin D levels); Future

## 2025-07-25 NOTE — PATIENT INSTRUCTIONS
Get your blood work as ordered.  You should hear from our office with results whether they are normal are not within a few days.  Please call the office if you do not hear from us.     Hypertension Plan:  You should check your blood pressures 2-3 times per month.  Your goal should be systolic (upper number ) < 140, and diastolic (bottom number) < 90.  Please periodically inform office of your BP numbers.  You should follow a low salt diet and exercise routinely.  It is important that you keep your weight under control.  With hypertension you should be seen in the office at least twice per year.    You should be getting cardiovascular exercise 3-5 times per week for 30-45 minutes.  This includes exercises such as running, brisk walking, biking or swimming.        It is important to actively try to reduce your weight to become healthier.  There are several things you can do to try and lose weight.  You should be getting 30-45 minutes of cardiovascular exercise 3-5 times per week, activities such as running and jogging treadmill swimming and brisk walking are helpful.  You will also need to watch your portion control, decrease calorie intake overall.  Following a low carbohydrate diet is the most successful way to lose weight and take it off long-term.  In order to achieve weight loss it is important that you track exactly what your calorie intake is during the day.  I usually recommend doing some sort of formal program or Coleen. there are lot of good Apps out there to help you follow your calorie intake such as weight watchers, Noom, Fitbit.  On average in order to achieve weight loss adult women should consume 1200 to 1500 ayanna/day, adult men 1500 to 1800 ayanna a day: However this varies based on physical activity.  It is recommended that you reduce the intake of sweets, sugar, reduce carbohydrate intake.  Healthy diets with more whole grains, vegetables, fruits, nuts and seeds with plant oils are healthier diets than  animal-based fats.  Reduce your intake of white bread, grains, potatoes, processed foods.      Advanced directives: I discussed with the patient  advanced care planning including explanation of discussions on advance directives.  If patient does not have current up-to-date documents, examples and information provided on living will and power of .  Patient was encouraged to work on getting these documents completed.  [place] has simple advance directives and living will forms that can be used.  Also, you can get more involved forms done through a  if you desire more detail on your living will plans or more involved plans for power of .

## 2025-07-26 ENCOUNTER — RESULTS FOLLOW-UP (OUTPATIENT)
Dept: PRIMARY CARE | Facility: CLINIC | Age: 65
End: 2025-07-26
Payer: MEDICARE

## 2025-07-26 LAB
25(OH)D3+25(OH)D2 SERPL-MCNC: 67 NG/ML (ref 30–100)
ALBUMIN SERPL-MCNC: 4.3 G/DL (ref 3.6–5.1)
ALP SERPL-CCNC: 44 U/L (ref 37–153)
ALT SERPL-CCNC: 35 U/L (ref 6–29)
ANION GAP SERPL CALCULATED.4IONS-SCNC: 9 MMOL/L (CALC) (ref 7–17)
AST SERPL-CCNC: 24 U/L (ref 10–35)
BASOPHILS # BLD AUTO: 72 CELLS/UL (ref 0–200)
BASOPHILS NFR BLD AUTO: 1 %
BILIRUB SERPL-MCNC: 0.7 MG/DL (ref 0.2–1.2)
BUN SERPL-MCNC: 16 MG/DL (ref 7–25)
CALCIUM SERPL-MCNC: 10.2 MG/DL (ref 8.6–10.4)
CHLORIDE SERPL-SCNC: 100 MMOL/L (ref 98–110)
CHOLEST SERPL-MCNC: 210 MG/DL
CHOLEST/HDLC SERPL: 4.7 (CALC)
CO2 SERPL-SCNC: 29 MMOL/L (ref 20–32)
CREAT SERPL-MCNC: 0.73 MG/DL (ref 0.5–1.05)
EGFRCR SERPLBLD CKD-EPI 2021: 91 ML/MIN/1.73M2
EOSINOPHIL # BLD AUTO: 302 CELLS/UL (ref 15–500)
EOSINOPHIL NFR BLD AUTO: 4.2 %
ERYTHROCYTE [DISTWIDTH] IN BLOOD BY AUTOMATED COUNT: 13.4 % (ref 11–15)
GLUCOSE SERPL-MCNC: 116 MG/DL (ref 65–99)
HCT VFR BLD AUTO: 49.8 % (ref 35–45)
HDLC SERPL-MCNC: 45 MG/DL
HGB BLD-MCNC: 16.5 G/DL (ref 11.7–15.5)
LDLC SERPL CALC-MCNC: 135 MG/DL (CALC)
LYMPHOCYTES # BLD AUTO: 2275 CELLS/UL (ref 850–3900)
LYMPHOCYTES NFR BLD AUTO: 31.6 %
MCH RBC QN AUTO: 28.9 PG (ref 27–33)
MCHC RBC AUTO-ENTMCNC: 33.1 G/DL (ref 32–36)
MCV RBC AUTO: 87.2 FL (ref 80–100)
MONOCYTES # BLD AUTO: 454 CELLS/UL (ref 200–950)
MONOCYTES NFR BLD AUTO: 6.3 %
NEUTROPHILS # BLD AUTO: 4097 CELLS/UL (ref 1500–7800)
NEUTROPHILS NFR BLD AUTO: 56.9 %
NONHDLC SERPL-MCNC: 165 MG/DL (CALC)
PLATELET # BLD AUTO: 286 THOUSAND/UL (ref 140–400)
PMV BLD REES-ECKER: 11 FL (ref 7.5–12.5)
POTASSIUM SERPL-SCNC: 4 MMOL/L (ref 3.5–5.3)
PROT SERPL-MCNC: 7.4 G/DL (ref 6.1–8.1)
RBC # BLD AUTO: 5.71 MILLION/UL (ref 3.8–5.1)
SODIUM SERPL-SCNC: 138 MMOL/L (ref 135–146)
TRIGL SERPL-MCNC: 163 MG/DL
WBC # BLD AUTO: 7.2 THOUSAND/UL (ref 3.8–10.8)

## 2025-07-28 NOTE — TELEPHONE ENCOUNTER
Result Communication    Resulted Orders   Comprehensive Metabolic Panel   Result Value Ref Range    GLUCOSE 116 (H) 65 - 99 mg/dL      Comment:                    Fasting reference interval     For someone without known diabetes, a glucose value  between 100 and 125 mg/dL is consistent with  prediabetes and should be confirmed with a  follow-up test.         UREA NITROGEN (BUN) 16 7 - 25 mg/dL    CREATININE 0.73 0.50 - 1.05 mg/dL    EGFR 91 > OR = 60 mL/min/1.73m2    SODIUM 138 135 - 146 mmol/L    POTASSIUM 4.0 3.5 - 5.3 mmol/L    CHLORIDE 100 98 - 110 mmol/L    CARBON DIOXIDE 29 20 - 32 mmol/L    ELECTROLYTE BALANCE 9 7 - 17 mmol/L (calc)    CALCIUM 10.2 8.6 - 10.4 mg/dL    PROTEIN, TOTAL 7.4 6.1 - 8.1 g/dL    ALBUMIN 4.3 3.6 - 5.1 g/dL    BILIRUBIN, TOTAL 0.7 0.2 - 1.2 mg/dL    ALKALINE PHOSPHATASE 44 37 - 153 U/L    AST 24 10 - 35 U/L    ALT 35 (H) 6 - 29 U/L    Narrative    FASTING:YES    FASTING: YES   CBC and Auto Differential   Result Value Ref Range    WHITE BLOOD CELL COUNT 7.2 3.8 - 10.8 Thousand/uL    RED BLOOD CELL COUNT 5.71 (H) 3.80 - 5.10 Million/uL    HEMOGLOBIN 16.5 (H) 11.7 - 15.5 g/dL    HEMATOCRIT 49.8 (H) 35.0 - 45.0 %    MCV 87.2 80.0 - 100.0 fL    MCH 28.9 27.0 - 33.0 pg    MCHC 33.1 32.0 - 36.0 g/dL      Comment:      For adults, a slight decrease in the calculated MCHC  value (in the range of 30 to 32 g/dL) is most likely  not clinically significant; however, it should be  interpreted with caution in correlation with other  red cell parameters and the patient's clinical  condition.      RDW 13.4 11.0 - 15.0 %    PLATELET COUNT 286 140 - 400 Thousand/uL    MPV 11.0 7.5 - 12.5 fL    ABSOLUTE NEUTROPHILS 4,097 1,500 - 7,800 cells/uL    ABSOLUTE LYMPHOCYTES 2,275 850 - 3,900 cells/uL    ABSOLUTE MONOCYTES 454 200 - 950 cells/uL    ABSOLUTE EOSINOPHILS 302 15 - 500 cells/uL    ABSOLUTE BASOPHILS 72 0 - 200 cells/uL    NEUTROPHILS 56.9 %    LYMPHOCYTES 31.6 %    MONOCYTES 6.3 %    EOSINOPHILS 4.2  %    BASOPHILS 1.0 %    Narrative    FASTING:YES    FASTING: YES   Lipid Panel   Result Value Ref Range    CHOLESTEROL, TOTAL 210 (H) <200 mg/dL    HDL CHOLESTEROL 45 (L) > OR = 50 mg/dL    TRIGLYCERIDES 163 (H) <150 mg/dL    LDL-CHOLESTEROL 135 (H) mg/dL (calc)      Comment:      Reference range: <100     Desirable range <100 mg/dL for primary prevention;    <70 mg/dL for patients with CHD or diabetic patients   with > or = 2 CHD risk factors.     LDL-C is now calculated using the Carlene   calculation, which is a validated novel method providing   better accuracy than the Friedewald equation in the   estimation of LDL-C.   Javier AMARO et al. GOGO. 2013;310(19): 9045-2583   (http://Jampp.Ikanos/faq/VCN957)      CHOL/HDLC RATIO 4.7 <5.0 (calc)    NON HDL CHOLESTEROL 165 (H) <130 mg/dL (calc)      Comment:      For patients with diabetes plus 1 major ASCVD risk   factor, treating to a non-HDL-C goal of <100 mg/dL   (LDL-C of <70 mg/dL) is considered a therapeutic   option.      Narrative    FASTING:YES    FASTING: YES   Vitamin D 25-Hydroxy,Total (for eval of Vitamin D levels)   Result Value Ref Range    VITAMIN D,25-OH,TOTAL,IA 67 30 - 100 ng/mL      Comment:      Vitamin D Status         25-OH Vitamin D:     Deficiency:                    <20 ng/mL  Insufficiency:             20 - 29 ng/mL  Optimal:                 > or = 30 ng/mL     For 25-OH Vitamin D testing on patients on   D2-supplementation and patients for whom quantitation   of D2 and D3 fractions is required, the [facility]D()  25-OH VIT D, (D2,D3), LC/MS/MS is recommended: order   code 62083 (patients >2yrs).     See Note 1     Note 1     For additional information, please refer to   http://education.Ikanos/faq/HAD380   (This link is being provided for informational/  educational purposes only.)      Narrative    FASTING:YES    FASTING: YES       11:37 AM      Results were successfully communicated with the patient  and they acknowledged their understanding.

## 2025-07-28 NOTE — TELEPHONE ENCOUNTER
----- Message from Socorro Perdomo sent at 7/26/2025 10:57 AM EDT -----  Cholesterol and sugar are elevated, recommend work on weight loss, regular exercise  Would consider cholesterol medications to reduce your risk of heart disease, I think if your cholesterol is elevated again in 6 months this would be a good idea  ----- Message -----  From: Joseline Overwatch Results In  Sent: 7/25/2025  11:29 PM EDT  To: Socorro Perdomo MD